# Patient Record
Sex: FEMALE | Race: BLACK OR AFRICAN AMERICAN | NOT HISPANIC OR LATINO | Employment: STUDENT | URBAN - METROPOLITAN AREA
[De-identification: names, ages, dates, MRNs, and addresses within clinical notes are randomized per-mention and may not be internally consistent; named-entity substitution may affect disease eponyms.]

---
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
## 2022-01-26 ENCOUNTER — TELEPHONE (OUTPATIENT)
Dept: FAMILY MEDICINE CLINIC | Facility: CLINIC | Age: 22
End: 2022-01-26

## 2022-01-26 NOTE — TELEPHONE ENCOUNTER
Caller: Berkley Salazar    Relationship to patient: Self    Best call back number: [phone]    Chief complaint: VAGINAL IRRITATION    Requested date: ASAP     Additional notes:THE PATIENT WOULD LIKE TO KNOW IF PCP CAN PERFORM AN STD TEST. SHE WOULD LIKE A CALL BACK TO DISCUSS

## 2022-02-03 ENCOUNTER — OFFICE VISIT (OUTPATIENT)
Dept: OBSTETRICS AND GYNECOLOGY | Facility: CLINIC | Age: 22
End: 2022-02-03

## 2022-02-03 VITALS
BODY MASS INDEX: 37.8 KG/M2 | DIASTOLIC BLOOD PRESSURE: 78 MMHG | HEART RATE: 67 BPM | WEIGHT: 264 LBS | SYSTOLIC BLOOD PRESSURE: 118 MMHG | HEIGHT: 70 IN

## 2022-02-03 DIAGNOSIS — R30.0 DYSURIA: Primary | ICD-10-CM

## 2022-02-03 DIAGNOSIS — N91.2 AMENORRHEA: ICD-10-CM

## 2022-02-03 LAB
BILIRUB BLD-MCNC: NEGATIVE MG/DL
CLARITY, POC: CLEAR
COLOR UR: YELLOW
GLUCOSE UR STRIP-MCNC: NEGATIVE MG/DL
KETONES UR QL: NEGATIVE
LEUKOCYTE EST, POC: NEGATIVE
NITRITE UR-MCNC: NEGATIVE MG/ML
PH UR: 6 [PH] (ref 5–8)
PROT UR STRIP-MCNC: NEGATIVE MG/DL
RBC # UR STRIP: NEGATIVE /UL
SP GR UR: 1.03 (ref 1–1.03)
UROBILINOGEN UR QL: NORMAL

## 2022-02-03 PROCEDURE — 81002 URINALYSIS NONAUTO W/O SCOPE: CPT | Performed by: NURSE PRACTITIONER

## 2022-02-03 PROCEDURE — 99212 OFFICE O/P EST SF 10 MIN: CPT | Performed by: NURSE PRACTITIONER

## 2022-02-03 NOTE — PROGRESS NOTES
"GYN Visit    CC:   Chief Complaint   Patient presents with   • Follow-up     3 month follow up        HPI:   21 y.o.No obstetric history on file. Contraception or HRT: None    Patient is here for follow-up on irregular cycles and secondary amenorrhea. States took Provera as prescribed and has had 2 cycles. Happy with current treatment and does not want any other birth control. Did have some urinary burning last week which is since resolved but requested urine dip which was negative.      History: PMHx, Meds, Allergies, PSHx, Social Hx, and POBHx all reviewed and updated.    Review of Systems   Constitutional: Negative.    HENT: Negative.    Eyes: Negative.    Respiratory: Negative.    Cardiovascular: Negative.    Endocrine: Negative.    Genitourinary: Positive for dysuria.   Musculoskeletal: Negative.    Skin: Negative.    Allergic/Immunologic: Negative.         No new allergies.   Neurological: Negative.    Hematological: Negative.    Psychiatric/Behavioral: Negative.        PHYSICAL EXAM:  /78 (BP Location: Right arm, Patient Position: Sitting, Cuff Size: Adult)   Pulse 67   Ht 177.8 cm (70\")   Wt 120 kg (264 lb)   Breastfeeding No   BMI 37.88 kg/m²      Physical Exam  Vitals and nursing note reviewed.   Constitutional:       Appearance: Normal appearance. She is well-developed and well-groomed.   Neurological:      Mental Status: She is alert.   Psychiatric:         Attention and Perception: Attention and perception normal.         Mood and Affect: Affect normal.         Speech: Speech normal.         Behavior: Behavior is cooperative.         Cognition and Memory: Cognition normal.         ASSESSMENT AND PLAN:  Diagnoses and all orders for this visit:    1. Dysuria (Primary)  -     POC Urinalysis Dipstick    2. Amenorrhea        Counseling:  • Patient will continue to monitor her cycle and return as needed.    Follow Up:  Return as needed.          Stefan Flores, APRN  02/03/2022    [facility] " [address]  [facility]  [address] [place] [address]  Dept: [phone]  Loc: [phone]

## 2022-02-17 ENCOUNTER — OFFICE VISIT (OUTPATIENT)
Dept: FAMILY MEDICINE CLINIC | Facility: CLINIC | Age: 22
End: 2022-02-17

## 2022-02-17 VITALS
BODY MASS INDEX: 36.43 KG/M2 | WEIGHT: 254.5 LBS | OXYGEN SATURATION: 97 % | HEART RATE: 112 BPM | DIASTOLIC BLOOD PRESSURE: 68 MMHG | HEIGHT: 70 IN | SYSTOLIC BLOOD PRESSURE: 128 MMHG | TEMPERATURE: 98.2 F

## 2022-02-17 DIAGNOSIS — J02.9 SORE THROAT: ICD-10-CM

## 2022-02-17 DIAGNOSIS — J02.9 ACUTE PHARYNGITIS, UNSPECIFIED ETIOLOGY: Primary | ICD-10-CM

## 2022-02-17 PROCEDURE — 99213 OFFICE O/P EST LOW 20 MIN: CPT | Performed by: FAMILY MEDICINE

## 2022-02-17 PROCEDURE — 96372 THER/PROPH/DIAG INJ SC/IM: CPT | Performed by: FAMILY MEDICINE

## 2022-02-17 RX ORDER — IBUPROFEN 200 MG
100 TABLET ORAL EVERY 6 HOURS PRN
COMMUNITY
End: 2022-03-17

## 2022-02-17 RX ORDER — ACETAMINOPHEN 160 MG/5ML
30 SOLUTION ORAL EVERY 4 HOURS PRN
COMMUNITY
End: 2022-03-17

## 2022-02-17 RX ORDER — TRIAMCINOLONE ACETONIDE 40 MG/ML
40 INJECTION, SUSPENSION INTRA-ARTICULAR; INTRAMUSCULAR ONCE
Status: COMPLETED | OUTPATIENT
Start: 2022-02-17 | End: 2022-02-17

## 2022-02-17 RX ORDER — AMOXICILLIN 400 MG/5ML
500 POWDER, FOR SUSPENSION ORAL 3 TIMES DAILY
COMMUNITY
Start: 2022-02-11 | End: 2022-02-17

## 2022-02-17 RX ORDER — AMOXICILLIN AND CLAVULANATE POTASSIUM 600; 42.9 MG/5ML; MG/5ML
875 POWDER, FOR SUSPENSION ORAL 2 TIMES DAILY
Qty: 144 ML | Refills: 0 | Status: SHIPPED | OUTPATIENT
Start: 2022-02-17 | End: 2022-02-26

## 2022-02-17 RX ORDER — BENZONATATE 200 MG/1
200 CAPSULE ORAL 3 TIMES DAILY PRN
Qty: 30 CAPSULE | Refills: 0 | Status: SHIPPED | OUTPATIENT
Start: 2022-02-17 | End: 2022-02-26

## 2022-02-17 RX ORDER — ACETAMINOPHEN 325 MG/1
325 TABLET ORAL EVERY 6 HOURS PRN
COMMUNITY
End: 2022-06-12

## 2022-02-17 RX ORDER — LIDOCAINE HYDROCHLORIDE 20 MG/ML
10 SOLUTION OROPHARYNGEAL 4 TIMES DAILY PRN
Qty: 280 ML | Refills: 1 | Status: SHIPPED | OUTPATIENT
Start: 2022-02-17 | End: 2022-02-24

## 2022-02-17 RX ADMIN — TRIAMCINOLONE ACETONIDE 40 MG: 40 INJECTION, SUSPENSION INTRA-ARTICULAR; INTRAMUSCULAR at 11:25

## 2022-02-17 NOTE — PROGRESS NOTES
"Chief Complaint  Sore throat    Subjective          Berkley Salazar presents to [facility]  History of Present Illness  Patient presents today for an acute visit complaining of a sore throat. She went to [facility] on 2/10/2022. At that time she was given amoxicillin. She had a hard time swallowing tablets that I sent in liquid amoxicillin for her. She tested negative for flu, strep, and Covid. Symptoms have been persisting. She is not getting any better and is still having fever cough and fatigue. She reports that it hurts to swallow. She has not been eating as much either.  Objective   Vital Signs:   /68   Pulse 112   Temp 98.2 °F (36.8 °C)   Ht 177.8 cm (70\")   Wt 115 kg (254 lb 8 oz)   SpO2 97%   BMI 36.52 kg/m²     Physical Exam   General: AAO ×3, ill-appearing  HEENT: Normocephalic, atraumatic, nasal congestion noted bilaterally with oropharyngeal erythema and tonsillar exudates, tonsils are enlarged 3+, there is no cervical tenderness or lymphadenopathy. No appreciable peritonsillar abscess. The uvula is midline.  Cardiovascular: Regular rate and rhythm without appreciable murmur  Respiratory: Clear to auscultation bilaterally no RRW  Gastrointestinal: Soft nontender nondistended with bowel sounds present  extremities: No edema  Neurologic: CN II through XII grossly intact   Psychiatric: Normal mood and affect  Result Review :                 Assessment and Plan    Diagnoses and all orders for this visit:    1. Acute pharyngitis, unspecified etiology (Primary)    2. Sore throat  -     triamcinolone acetonide (KENALOG-40) injection 40 mg    Other orders  -     Lidocaine Viscous HCl (XYLOCAINE) 2 % solution; Take 10 mL by mouth 4 (Four) Times a Day As Needed for Moderate Pain  for up to 7 days.  Dispense: 280 mL; Refill: 1  -     prednisoLONE (PRELONE) 15 MG/5ML syrup; Take 10 ml PO daily x 5 days  Dispense: 50 mL; Refill: 0  -     benzonatate (TESSALON) 200 MG capsule; " Take 1 capsule by mouth 3 (Three) Times a Day As Needed for Cough for up to 10 days.  Dispense: 30 capsule; Refill: 0  -     amoxicillin-clavulanate (Augmentin ES-600) 600-42.9 MG/5ML suspension; Take 7.2 mL by mouth 2 (Two) Times a Day for 10 days.  Dispense: 144 mL; Refill: 0    Patient has not been responding to amoxicillin. I will switch her to Augmentin give her 40 mg of Kenalog today and give her liquid prednisolone, and viscous lidocaine. Patient instructed to also use Chloraseptic spray if needed. Plan to see her back in 1 week for close follow-up. I will keep her out of school until she returns for follow-up. If she gets worse including difficulty breathing or unable to swallow at all she is instructed to go to the emergency room.    Follow Up   Return in about 1 week (around 2/24/2022) for pharyngitis.  Patient was given instructions and counseling regarding her condition or for health maintenance advice. Please see specific information pulled into the AVS if appropriate.

## 2022-02-21 ENCOUNTER — OFFICE VISIT (OUTPATIENT)
Dept: FAMILY MEDICINE CLINIC | Facility: CLINIC | Age: 22
End: 2022-02-21

## 2022-02-21 VITALS
DIASTOLIC BLOOD PRESSURE: 80 MMHG | OXYGEN SATURATION: 97 % | TEMPERATURE: 97.5 F | HEIGHT: 70 IN | SYSTOLIC BLOOD PRESSURE: 112 MMHG | WEIGHT: 251.8 LBS | HEART RATE: 82 BPM | BODY MASS INDEX: 36.05 KG/M2

## 2022-02-21 DIAGNOSIS — J02.9 ACUTE PHARYNGITIS, UNSPECIFIED ETIOLOGY: Primary | ICD-10-CM

## 2022-02-21 PROCEDURE — 99212 OFFICE O/P EST SF 10 MIN: CPT | Performed by: FAMILY MEDICINE

## 2022-02-21 NOTE — PROGRESS NOTES
"Chief Complaint  Follow up for sore throat  Improved sore throat    Subjective          Berkley Salazar presents to [facility]  History of Present Illness  Patient presents today for follow-up for sore throat/pharyngitis.  I saw her for a visit on 2/17/2022.  At that time she was following up from a Taras's visit from 2/10/2022.  She was given amoxicillin however she was not getting any better.  She is feeling better now.  I placed her on prednisolone, Tessalon Perles, and Augmentin.  I also gave her viscous lidocaine.  She has not been using the viscous lidocaine as much but reports getting better with treatment.  Her throat is still little bit sore.  She denies any fever.  Patient is able to take in more food now however her weight has still come down from the last visit.  She is down 3 pounds from last visit.  Objective   Vital Signs:   /80   Pulse 82   Temp 97.5 °F (36.4 °C)   Ht 177.8 cm (70\")   Wt 114 kg (251 lb 12.8 oz)   SpO2 97%   BMI 36.13 kg/m²     Physical Exam   General: AAO ×3, no acute distress, pleasant  HEENT: Normocephalic, atraumatic, no discharge in the eyes, no discharge from the nose, tonsils are smaller compared to previous at 2+ with no exudate, and TMs intact bilaterally with no erythema, no cervical tenderness or lymphadenopathy  Cardiovascular: Regular rate and rhythm without appreciable murmur  Respiratory: Clear to auscultation bilaterally no RRW  Gastrointestinal: Soft nontender nondistended with bowel sounds present  extremities: No edema  Neurologic: CN II through XII grossly intact   Psychiatric: Normal mood and affect  Result Review :                 Assessment and Plan    Diagnoses and all orders for this visit:    1. Acute pharyngitis, unspecified etiology (Primary)    Patient doing a lot better.  We discussed continue current management of acute pharyngitis with Augmentin.  She will continue with her prednisolone as well.  Patient to " return as needed should any further issues or concerns arise.    Follow Up   Return if symptoms worsen or fail to improve.  Patient was given instructions and counseling regarding her condition or for health maintenance advice. Please see specific information pulled into the AVS if appropriate.

## 2022-02-26 ENCOUNTER — HOSPITAL ENCOUNTER (EMERGENCY)
Facility: HOSPITAL | Age: 22
Discharge: HOME | End: 2022-02-26
Attending: EMERGENCY MEDICINE | Admitting: EMERGENCY MEDICINE

## 2022-02-26 VITALS
RESPIRATION RATE: 20 BRPM | OXYGEN SATURATION: 97 % | SYSTOLIC BLOOD PRESSURE: 106 MMHG | DIASTOLIC BLOOD PRESSURE: 69 MMHG | HEART RATE: 115 BPM | TEMPERATURE: 99.9 F | WEIGHT: 250.44 LBS | HEIGHT: 70 IN | BODY MASS INDEX: 35.85 KG/M2

## 2022-02-26 DIAGNOSIS — J03.90 TONSILLITIS WITH EXUDATE: Primary | ICD-10-CM

## 2022-02-26 DIAGNOSIS — R09.89 TONSIL PAIN: ICD-10-CM

## 2022-02-26 DIAGNOSIS — R05.9 COUGH: ICD-10-CM

## 2022-02-26 LAB
ALBUMIN SERPL-MCNC: 3.9 G/DL (ref 3.5–5.2)
ALBUMIN/GLOB SERPL: 1 G/DL
ALP SERPL-CCNC: 82 U/L (ref 39–117)
ALT SERPL W P-5'-P-CCNC: 29 U/L (ref 1–33)
ANION GAP SERPL CALCULATED.3IONS-SCNC: 13.1 MMOL/L (ref 5–15)
ANISOCYTOSIS BLD QL: NORMAL
AST SERPL-CCNC: 18 U/L (ref 1–32)
BASOPHILS # BLD AUTO: 0.03 10*3/MM3 (ref 0–0.2)
BASOPHILS NFR BLD AUTO: 0.2 % (ref 0–1.5)
BILIRUB SERPL-MCNC: 0.7 MG/DL (ref 0–1.2)
BUN SERPL-MCNC: 10 MG/DL (ref 6–20)
BUN/CREAT SERPL: 9.4 (ref 7–25)
CALCIUM SPEC-SCNC: 9.2 MG/DL (ref 8.6–10.5)
CHLORIDE SERPL-SCNC: 100 MMOL/L (ref 98–107)
CLUMPED PLATELETS: PRESENT
CO2 SERPL-SCNC: 23.9 MMOL/L (ref 22–29)
CREAT SERPL-MCNC: 1.06 MG/DL (ref 0.57–1)
DEPRECATED RDW RBC AUTO: 37.6 FL (ref 37–54)
EOSINOPHIL # BLD AUTO: 0.01 10*3/MM3 (ref 0–0.4)
EOSINOPHIL NFR BLD AUTO: 0.1 % (ref 0.3–6.2)
ERYTHROCYTE [DISTWIDTH] IN BLOOD BY AUTOMATED COUNT: 17 % (ref 12.3–15.4)
GFR SERPL CREATININE-BSD FRML MDRD: 81 ML/MIN/1.73
GLOBULIN UR ELPH-MCNC: 3.8 GM/DL
GLUCOSE SERPL-MCNC: 114 MG/DL (ref 65–99)
HCG INTACT+B SERPL-ACNC: <0.5 MIU/ML
HCT VFR BLD AUTO: 36.5 % (ref 34–46.6)
HETEROPH AB SER QL LA: NEGATIVE
HGB BLD-MCNC: 12.3 G/DL (ref 12–15.9)
HOLD SPECIMEN: NORMAL
HOLD SPECIMEN: NORMAL
IMM GRANULOCYTES # BLD AUTO: 0.09 10*3/MM3 (ref 0–0.05)
IMM GRANULOCYTES NFR BLD AUTO: 0.5 % (ref 0–0.5)
LARGE PLATELETS: NORMAL
LYMPHOCYTES # BLD AUTO: 2.32 10*3/MM3 (ref 0.7–3.1)
LYMPHOCYTES NFR BLD AUTO: 13.9 % (ref 19.6–45.3)
MACROCYTES BLD QL SMEAR: NORMAL
MCH RBC QN AUTO: 22 PG (ref 26.6–33)
MCHC RBC AUTO-ENTMCNC: 33.7 G/DL (ref 31.5–35.7)
MCV RBC AUTO: 65.2 FL (ref 79–97)
MICROCYTES BLD QL: NORMAL
MONOCYTES # BLD AUTO: 1.21 10*3/MM3 (ref 0.1–0.9)
MONOCYTES NFR BLD AUTO: 7.2 % (ref 5–12)
NEUTROPHILS NFR BLD AUTO: 13.07 10*3/MM3 (ref 1.7–7)
NEUTROPHILS NFR BLD AUTO: 78.1 % (ref 42.7–76)
NRBC BLD AUTO-RTO: 0 /100 WBC (ref 0–0.2)
PLATELET # BLD AUTO: 388 10*3/MM3 (ref 140–450)
PMV BLD AUTO: 9.7 FL (ref 6–12)
POTASSIUM SERPL-SCNC: 4 MMOL/L (ref 3.5–5.2)
PROT SERPL-MCNC: 7.7 G/DL (ref 6–8.5)
RBC # BLD AUTO: 5.6 10*6/MM3 (ref 3.77–5.28)
S PYO AG THROAT QL: NEGATIVE
SMALL PLATELETS BLD QL SMEAR: ADEQUATE
SODIUM SERPL-SCNC: 137 MMOL/L (ref 136–145)
TARGETS BLD QL SMEAR: NORMAL
WBC MORPH BLD: NORMAL
WBC NRBC COR # BLD: 16.73 10*3/MM3 (ref 3.4–10.8)
WHOLE BLOOD HOLD SPECIMEN: NORMAL
WHOLE BLOOD HOLD SPECIMEN: NORMAL

## 2022-02-26 PROCEDURE — 36415 COLL VENOUS BLD VENIPUNCTURE: CPT

## 2022-02-26 PROCEDURE — 87081 CULTURE SCREEN ONLY: CPT

## 2022-02-26 PROCEDURE — 99283 EMERGENCY DEPT VISIT LOW MDM: CPT

## 2022-02-26 PROCEDURE — 80053 COMPREHEN METABOLIC PANEL: CPT | Performed by: PHYSICIAN ASSISTANT

## 2022-02-26 PROCEDURE — 84702 CHORIONIC GONADOTROPIN TEST: CPT | Performed by: PHYSICIAN ASSISTANT

## 2022-02-26 PROCEDURE — 86308 HETEROPHILE ANTIBODY SCREEN: CPT | Performed by: PHYSICIAN ASSISTANT

## 2022-02-26 PROCEDURE — 85025 COMPLETE CBC W/AUTO DIFF WBC: CPT | Performed by: PHYSICIAN ASSISTANT

## 2022-02-26 PROCEDURE — 96372 THER/PROPH/DIAG INJ SC/IM: CPT

## 2022-02-26 PROCEDURE — 87880 STREP A ASSAY W/OPTIC: CPT

## 2022-02-26 PROCEDURE — 25010000002 DEXAMETHASONE PER 1 MG

## 2022-02-26 PROCEDURE — 85007 BL SMEAR W/DIFF WBC COUNT: CPT | Performed by: PHYSICIAN ASSISTANT

## 2022-02-26 RX ORDER — ACETAMINOPHEN 160 MG/5ML
650 SOLUTION ORAL ONCE
Status: COMPLETED | OUTPATIENT
Start: 2022-02-26 | End: 2022-02-26

## 2022-02-26 RX ORDER — SODIUM CHLORIDE 0.9 % (FLUSH) 0.9 %
10 SYRINGE (ML) INJECTION AS NEEDED
Status: DISCONTINUED | OUTPATIENT
Start: 2022-02-26 | End: 2022-02-26 | Stop reason: HOSPADM

## 2022-02-26 RX ORDER — DEXAMETHASONE SODIUM PHOSPHATE 10 MG/ML
10 INJECTION INTRAMUSCULAR; INTRAVENOUS ONCE
Status: COMPLETED | OUTPATIENT
Start: 2022-02-26 | End: 2022-02-26

## 2022-02-26 RX ORDER — CLINDAMYCIN HYDROCHLORIDE 300 MG/1
300 CAPSULE ORAL 3 TIMES DAILY
Qty: 21 CAPSULE | Refills: 0 | Status: SHIPPED | OUTPATIENT
Start: 2022-02-26 | End: 2022-03-05

## 2022-02-26 RX ORDER — BROMPHENIRAMINE MALEATE, PSEUDOEPHEDRINE HYDROCHLORIDE, AND DEXTROMETHORPHAN HYDROBROMIDE 2; 30; 10 MG/5ML; MG/5ML; MG/5ML
10 SYRUP ORAL ONCE
Status: COMPLETED | OUTPATIENT
Start: 2022-02-26 | End: 2022-02-26

## 2022-02-26 RX ORDER — BROMPHENIRAMINE MALEATE, PSEUDOEPHEDRINE HYDROCHLORIDE, AND DEXTROMETHORPHAN HYDROBROMIDE 2; 30; 10 MG/5ML; MG/5ML; MG/5ML
10 SYRUP ORAL 4 TIMES DAILY PRN
Qty: 118 ML | Refills: 0 | Status: SHIPPED | OUTPATIENT
Start: 2022-02-26 | End: 2022-03-17

## 2022-02-26 RX ADMIN — DEXAMETHASONE SODIUM PHOSPHATE 10 MG: 10 INJECTION INTRAMUSCULAR; INTRAVENOUS at 17:23

## 2022-02-26 RX ADMIN — BROMPHENIRAMINE MALEATE, PSEUDOEPHEDRINE HYDROCHLORIDE, AND DEXTROMETHORPHAN HYDROBROMIDE 10 ML: 2; 30; 10 SYRUP ORAL at 17:22

## 2022-02-26 RX ADMIN — ACETAMINOPHEN 650 MG: 160 SOLUTION ORAL at 17:22

## 2022-02-28 LAB — BACTERIA SPEC AEROBE CULT: NORMAL

## 2022-03-08 ENCOUNTER — OFFICE VISIT (OUTPATIENT)
Dept: FAMILY MEDICINE CLINIC | Facility: CLINIC | Age: 22
End: 2022-03-08

## 2022-03-08 VITALS
BODY MASS INDEX: 36.05 KG/M2 | WEIGHT: 251.8 LBS | OXYGEN SATURATION: 98 % | TEMPERATURE: 97.5 F | DIASTOLIC BLOOD PRESSURE: 78 MMHG | HEIGHT: 70 IN | HEART RATE: 66 BPM | SYSTOLIC BLOOD PRESSURE: 114 MMHG

## 2022-03-08 DIAGNOSIS — D72.829 LEUKOCYTOSIS, UNSPECIFIED TYPE: ICD-10-CM

## 2022-03-08 DIAGNOSIS — J03.90 TONSILLITIS: Primary | ICD-10-CM

## 2022-03-08 LAB
BASOPHILS # BLD AUTO: 0.02 10*3/MM3 (ref 0–0.2)
BASOPHILS NFR BLD AUTO: 0.2 % (ref 0–1.5)
DEPRECATED RDW RBC AUTO: 45.2 FL (ref 37–54)
EOSINOPHIL # BLD AUTO: 0.03 10*3/MM3 (ref 0–0.4)
EOSINOPHIL NFR BLD AUTO: 0.3 % (ref 0.3–6.2)
ERYTHROCYTE [DISTWIDTH] IN BLOOD BY AUTOMATED COUNT: 19.1 % (ref 12.3–15.4)
HCT VFR BLD AUTO: 38 % (ref 34–46.6)
HGB BLD-MCNC: 12 G/DL (ref 12–15.9)
IMM GRANULOCYTES # BLD AUTO: 0.05 10*3/MM3 (ref 0–0.05)
IMM GRANULOCYTES NFR BLD AUTO: 0.5 % (ref 0–0.5)
LYMPHOCYTES # BLD AUTO: 2.15 10*3/MM3 (ref 0.7–3.1)
LYMPHOCYTES NFR BLD AUTO: 20.9 % (ref 19.6–45.3)
MCH RBC QN AUTO: 22.3 PG (ref 26.6–33)
MCHC RBC AUTO-ENTMCNC: 31.6 G/DL (ref 31.5–35.7)
MCV RBC AUTO: 70.6 FL (ref 79–97)
MONOCYTES # BLD AUTO: 0.55 10*3/MM3 (ref 0.1–0.9)
MONOCYTES NFR BLD AUTO: 5.3 % (ref 5–12)
NEUTROPHILS NFR BLD AUTO: 7.5 10*3/MM3 (ref 1.7–7)
NEUTROPHILS NFR BLD AUTO: 72.8 % (ref 42.7–76)
NRBC BLD AUTO-RTO: 0 /100 WBC (ref 0–0.2)
PLATELET # BLD AUTO: 328 10*3/MM3 (ref 140–450)
PMV BLD AUTO: 10.4 FL (ref 6–12)
RBC # BLD AUTO: 5.38 10*6/MM3 (ref 3.77–5.28)
WBC NRBC COR # BLD: 10.3 10*3/MM3 (ref 3.4–10.8)

## 2022-03-08 PROCEDURE — 36415 COLL VENOUS BLD VENIPUNCTURE: CPT | Performed by: FAMILY MEDICINE

## 2022-03-08 PROCEDURE — 99213 OFFICE O/P EST LOW 20 MIN: CPT | Performed by: FAMILY MEDICINE

## 2022-03-08 PROCEDURE — 86665 EPSTEIN-BARR CAPSID VCA: CPT | Performed by: FAMILY MEDICINE

## 2022-03-08 PROCEDURE — 86664 EPSTEIN-BARR NUCLEAR ANTIGEN: CPT | Performed by: FAMILY MEDICINE

## 2022-03-08 PROCEDURE — 85025 COMPLETE CBC W/AUTO DIFF WBC: CPT | Performed by: FAMILY MEDICINE

## 2022-03-08 RX ORDER — CLINDAMYCIN PALMITATE HYDROCHLORIDE 75 MG/5ML
300 SOLUTION ORAL 3 TIMES DAILY
Qty: 360 ML | Refills: 0 | Status: SHIPPED | OUTPATIENT
Start: 2022-03-08 | End: 2022-03-14

## 2022-03-08 RX ORDER — CLINDAMYCIN PALMITATE HYDROCHLORIDE 75 MG/5ML
SOLUTION ORAL
COMMUNITY
Start: 2022-02-27 | End: 2022-03-08 | Stop reason: SDUPTHER

## 2022-03-08 NOTE — PROGRESS NOTES
Chief Complaint  Sore Throat    Subjective          Berkley Salazar presents to [facility]  History of Present Illness  Patient presents today to follow-up for sore throat/tonsillitis.  I saw her last on 2/21/2022.  At that time we discussed continue current management for pharyngitis with Augmentin.  She was given this along with prednisolone on 2/17/2022.  Unfortunately her symptoms continue to persist and she went to urgent care and then was referred to the emergency room due to concerns about tonsillar abscess.  In emergency room he was evaluated.  She was tested for strep a screen which was negative.  Her strep culture showed no beta-hemolytic Streptococcus isolated.  Her Monospot test was negative.  White blood cell count was elevated at 16.73.  She was empirically treated at that time for possible mono or a tonsillar abscess with antibiotics.  She was given clindamycin to start taking.  She reports that she received a total of 5 bottles of 75 mg per 5 mL clindamycin to take 20 mL 3 times a day which is 300 mg 3 times a day.  The 5 bottles equates to a total of 8 and 1/3 days of antibiotics.  She is feeling a lot better now but still having some soreness of throat.  I discussed with her completing a full 14 days I will send in more clindamycin to complete 14 days worth of antibiotics.  I discussed with her reassessment in 1 week.  I will also check an Perry-Barr virus antibody panel given concern for mono.  She overall is feeling better.  We discussed considering referral to ENT however he would like to hold off at this time and we can reassess in 1 week.  She is feeling better we discussed that she does not need to have this follow-up appointment however if she is still having issues I certainly encouraged her to keep the appointment.  She denies any more fever since she went to urgent care on 2/26/2022.  Objective   Vital Signs:   /78   Pulse 66   Temp 97.5 °F (36.4  "°C)   Ht 177.8 cm (70\")   Wt 114 kg (251 lb 12.8 oz)   SpO2 98%   BMI 36.13 kg/m²     Physical Exam   General: AAO ×3, no acute distress, pleasant  HEENT: Normocephalic, atraumatic, TM intact bilaterally with no erythema or effusion, no nasal congestion noted, there is some oropharyngeal erythema with tonsils slightly enlarged, 2+ with no exudate.  Cardiovascular: Regular rate and rhythm without appreciable murmur  Respiratory: Clear to auscultation bilaterally no RRW  Gastrointestinal: Soft nontender nondistended with bowel sounds present  extremities: No edema  Neurologic: CN II through XII grossly intact   Psychiatric: Normal mood and affect  Result Review :                 Assessment and Plan    Diagnoses and all orders for this visit:    1. Tonsillitis (Primary)  -     EBV Antibody Profile; Future  -     clindamycin (CLEOCIN) 75 MG/5ML solution; Take 20 mL by mouth 3 (Three) Times a Day for 6 days.  Dispense: 360 mL; Refill: 0  -     CBC & Differential    2. Leukocytosis, unspecified type  -     CBC & Differential    I discussed with patient checking Perry-Barr virus antibody profile today and I will give her another 6 days of clindamycin to complete 14 days of antibiotics.  I will also plan to recheck the CBC today.  Plan to see patient back in 1 week or sooner if needed.  Patient instructed to call with any questions or concerns.    Follow Up   Return in about 1 week (around 3/15/2022) for tonsillitis.  Patient was given instructions and counseling regarding her condition or for health maintenance advice. Please see specific information pulled into the AVS if appropriate.       "

## 2022-03-10 LAB
EBV NA IGG SER IA-ACNC: 66.1 U/ML (ref 0–17.9)
EBV VCA IGG SER IA-ACNC: >600 U/ML (ref 0–17.9)
EBV VCA IGM SER IA-ACNC: <36 U/ML (ref 0–35.9)
SERVICE CMNT-IMP: ABNORMAL

## 2022-03-17 ENCOUNTER — OFFICE VISIT (OUTPATIENT)
Dept: FAMILY MEDICINE CLINIC | Facility: CLINIC | Age: 22
End: 2022-03-17

## 2022-03-17 VITALS
OXYGEN SATURATION: 96 % | HEART RATE: 68 BPM | WEIGHT: 260.4 LBS | DIASTOLIC BLOOD PRESSURE: 74 MMHG | BODY MASS INDEX: 37.28 KG/M2 | TEMPERATURE: 98.4 F | SYSTOLIC BLOOD PRESSURE: 114 MMHG | HEIGHT: 70 IN

## 2022-03-17 DIAGNOSIS — B27.00 GAMMAHERPESVIRAL MONONUCLEOSIS WITHOUT COMPLICATION: Primary | ICD-10-CM

## 2022-03-17 DIAGNOSIS — J03.90 TONSILLITIS: ICD-10-CM

## 2022-03-17 DIAGNOSIS — R21 RASH: ICD-10-CM

## 2022-03-17 PROCEDURE — 99213 OFFICE O/P EST LOW 20 MIN: CPT | Performed by: FAMILY MEDICINE

## 2022-03-17 NOTE — PROGRESS NOTES
"Chief Complaint  Tonsillitis  Rash    Subjective          Berkley Salazar presents to [facility]  History of Present Illness  Patient presents today for follow-up for tonsillitis.  Last time I saw her for an appointment was on 3/8/2021.  She has been dealing with tonsillitis since 2/10/2022.  She was seen in emergency room and treated for mono with concern about tonsillar abscess and placed on clindamycin.  When I saw her for follow-up on 3/8/2022 I had her to complete a total of 14 days of clindamycin.  She was on amoxicillin before then as well as Augmentin.  She developed a rash a few days after starting amoxicillin and there was concern about allergy to amoxicillin.  She reports the rash is resolving on its own.  She saw urgent care on 3/9/2022.  I told her that they were concerned about an amoxicillin allergy and this was added to her list.  She denies any widespread body rash and reports that it was only in the bikini line area.  She took clindamycin without any issues.  I did have labs done for the patient prior to this appointment.  He tested positive for EBV VCA IgG and EBV nuclear antigen antibody but negative for VCA IgM antibodies.  This suggest past infection.  She has improved now.  She still has some discomfort with swallowing but overall feels better and denies any more fevers.  Objective   Vital Signs:   /74   Pulse 68   Temp 98.4 °F (36.9 °C)   Ht 177.8 cm (70\")   Wt 118 kg (260 lb 6.4 oz)   SpO2 96%   BMI 37.36 kg/m²     Physical Exam   General: AAO ×3, no acute distress, pleasant  HEENT: Normocephalic, atraumatic, no discharge in the eyes, no discharge from the nose, no oropharyngeal erythema or exudates, tonsils are 1+ bilaterally and TMs intact bilaterally with no erythema, no cervical tenderness or lymphadenopathy  Cardiovascular: Regular rate and rhythm without appreciable murmur  Respiratory: Clear to auscultation bilaterally no " RRW  Gastrointestinal: Soft nontender nondistended with bowel sounds present  Skin: No appreciable rash in the area of concern.  extremities: No edema  Neurologic: CN II through XII grossly intact   Psychiatric: Normal mood and affect  Result Review :                 Assessment and Plan    Diagnoses and all orders for this visit:    1. Gammaherpesviral mononucleosis without complication (Primary)    2. Rash    3. Tonsillitis    I discussed with patient the diagnosis of mononucleosis.  Her symptoms are improving.  She has had symptoms since 2/10/2022.  She does not need any more antibiotics at this time.  Her tonsillitis is resolving.  We have previously discussed ENT consultation however I will hold off at this time given that she has symptom improvement.  Her rash is resolving as well.  Patient instructed to call return if she has any worsening symptoms or further concerns.    Follow Up   Return if symptoms worsen or fail to improve.  Patient was given instructions and counseling regarding her condition or for health maintenance advice. Please see specific information pulled into the AVS if appropriate.

## 2022-05-20 ENCOUNTER — TELEPHONE (OUTPATIENT)
Dept: FAMILY MEDICINE CLINIC | Facility: CLINIC | Age: 22
End: 2022-05-20

## 2022-05-20 NOTE — TELEPHONE ENCOUNTER
Caller: YAZMIN BECKER    Relationship: Mother    Best call back number: [phone]    What is the medical concern/diagnosis: THROAT     What specialty or service is being requested: EAR NOSE AND THROAT     What is the provider, practice or medical service name: N/A    What is the office location: [place]     What is the office phone number:N/A

## 2022-05-25 DIAGNOSIS — J03.91 RECURRENT TONSILLITIS: Primary | ICD-10-CM

## 2022-06-06 ENCOUNTER — TELEPHONE (OUTPATIENT)
Dept: FAMILY MEDICINE CLINIC | Facility: CLINIC | Age: 22
End: 2022-06-06

## 2022-06-06 NOTE — TELEPHONE ENCOUNTER
Caller: Berkley Salazar    Relationship: Self    Best call back number: [phone]    What is the medical concern/diagnosis: ACNE SCARING AND SKIN TAG REMOVAL    What specialty or service is being requested: DERMATOLOGY    Any additional details: PATIENT STATED SHE IS NEEDING A REFERRAL TO DERMATOLOGY IN [place]

## 2022-06-10 DIAGNOSIS — K64.9 HEMORRHOIDS, UNSPECIFIED HEMORRHOID TYPE: Primary | ICD-10-CM

## 2022-06-16 ENCOUNTER — OFFICE VISIT (OUTPATIENT)
Dept: FAMILY MEDICINE CLINIC | Facility: CLINIC | Age: 22
End: 2022-06-16

## 2022-06-16 VITALS
DIASTOLIC BLOOD PRESSURE: 74 MMHG | HEIGHT: 70 IN | WEIGHT: 253.4 LBS | SYSTOLIC BLOOD PRESSURE: 120 MMHG | BODY MASS INDEX: 36.28 KG/M2 | HEART RATE: 88 BPM | OXYGEN SATURATION: 98 % | TEMPERATURE: 98.1 F

## 2022-06-16 DIAGNOSIS — L91.8 SKIN TAG: Primary | ICD-10-CM

## 2022-06-16 DIAGNOSIS — R39.11 URINARY HESITANCY: ICD-10-CM

## 2022-06-16 DIAGNOSIS — R39.198 DIFFICULTY URINATING: ICD-10-CM

## 2022-06-16 DIAGNOSIS — L70.0 ACNE VULGARIS: ICD-10-CM

## 2022-06-16 LAB
BILIRUB BLD-MCNC: ABNORMAL MG/DL
CLARITY, POC: CLEAR
COLOR UR: ABNORMAL
EXPIRATION DATE: ABNORMAL
GLUCOSE UR STRIP-MCNC: NEGATIVE MG/DL
KETONES UR QL: ABNORMAL
LEUKOCYTE EST, POC: NEGATIVE
Lab: ABNORMAL
NITRITE UR-MCNC: NEGATIVE MG/ML
PH UR: 5.5 [PH] (ref 5–8)
PROT UR STRIP-MCNC: NEGATIVE MG/DL
RBC # UR STRIP: NEGATIVE /UL
SP GR UR: 1.03 (ref 1–1.03)
UROBILINOGEN UR QL: NORMAL

## 2022-06-16 PROCEDURE — 87086 URINE CULTURE/COLONY COUNT: CPT | Performed by: NURSE PRACTITIONER

## 2022-06-16 PROCEDURE — 99214 OFFICE O/P EST MOD 30 MIN: CPT | Performed by: NURSE PRACTITIONER

## 2022-06-16 PROCEDURE — 81003 URINALYSIS AUTO W/O SCOPE: CPT | Performed by: NURSE PRACTITIONER

## 2022-06-16 NOTE — PROGRESS NOTES
"Chief Complaint  skin tag removal and Difficulty Urinating    Subjective        Berkley Salazar presents to [facility]  History of Present Illness  Presents today for an acute visit for difficulty urinating.  Patient reports she is having to push to urinate.  Patient has some urinary hesitancy.  Denies urinary frequency and dysuria.  Denies fever chills and back pain.  Has low blood abdominal discomfort in the suprapubic acute region.    Patient states she has skin tags on her neck.  States they are bothersome and catches on a seatbelt.  Patient inquiring about having skin tags removed.    Patient also states she has acne.  She has some acne scars.  She would like to see dermatologist for further evaluation and treatment.    Objective   Vital Signs:  /74 (BP Location: Left arm, Patient Position: Sitting)   Pulse 88   Temp 98.1 °F (36.7 °C)   Ht 177.8 cm (70\")   Wt 115 kg (253 lb 6.4 oz)   SpO2 98%   BMI 36.36 kg/m²   Estimated body mass index is 36.36 kg/m² as calculated from the following:    Height as of this encounter: 177.8 cm (70\").    Weight as of this encounter: 115 kg (253 lb 6.4 oz).          Physical Exam  Vitals reviewed.   Constitutional:       Appearance: Normal appearance. She is well-developed.   HENT:      Head: Normocephalic and atraumatic.      Right Ear: External ear normal.      Left Ear: External ear normal.      Mouth/Throat:      Pharynx: No oropharyngeal exudate.   Eyes:      Conjunctiva/sclera: Conjunctivae normal.      Pupils: Pupils are equal, round, and reactive to light.   Cardiovascular:      Rate and Rhythm: Normal rate and regular rhythm.      Heart sounds: No murmur heard.    No friction rub. No gallop.   Pulmonary:      Effort: Pulmonary effort is normal.      Breath sounds: Normal breath sounds. No wheezing or rhonchi.   Abdominal:      General: Bowel sounds are normal. There is no distension.      Palpations: Abdomen is soft.      " Tenderness: There is abdominal tenderness in the suprapubic area.   Skin:     General: Skin is warm and dry.      Comments: 2 small skin tags on the right posterior neck   Neurological:      Mental Status: She is alert and oriented to person, place, and time.        Result Review :       Brief Urine Lab Results  (Last result in the past 365 days)      Color   Clarity   Blood   Leuk Est   Nitrite   Protein   CREAT   Urine HCG        06/16/22 0753 Dark Yellow   Clear   Negative   Negative   Negative   Negative                    Skin Tag Removal    Date/Time: 6/16/2022 8:57 AM  Performed by: Ezio Rg APRN  Authorized by: Ezio Rg APRN   Preparation: Patient was prepped, alcohol cleanse the skin tags.  Local anesthesia used: yes  Anesthesia: local infiltration    Anesthesia:  Local anesthesia used: yes  Local Anesthetic: lidocaine 1% with epinephrine    Sedation:  Patient sedated: no    Patient tolerance: patient tolerated the procedure well with no immediate complications  Comments: 2 skin tags removed with derma blade on the right posterior neck            Assessment and Plan   Diagnoses and all orders for this visit:    1. Skin tag (Primary)    2. Difficulty urinating  -     POCT urinalysis dipstick, automated  -     Urine Culture - Urine, Urine, Clean Catch; Future  -     Urine Culture - Urine, Urine, Clean Catch    3. Urinary hesitancy    4. Acne vulgaris  -     Ambulatory Referral to Dermatology    Other orders  -     Skin Tag Removal    Increase fluid intake.  We will send for urine culture and sensitivity.  Will consult dermatology for her acne.         Follow Up   Return if symptoms worsen or fail to improve.  Patient was given instructions and counseling regarding her condition or for health maintenance advice. Please see specific information pulled into the AVS if appropriate.

## 2022-06-17 LAB — BACTERIA SPEC AEROBE CULT: NORMAL

## 2022-06-17 RX ORDER — NITROFURANTOIN 25; 75 MG/1; MG/1
100 CAPSULE ORAL 2 TIMES DAILY
Qty: 10 CAPSULE | Refills: 0 | Status: SHIPPED | OUTPATIENT
Start: 2022-06-17 | End: 2022-06-22

## 2022-07-15 ENCOUNTER — OFFICE VISIT (OUTPATIENT)
Dept: OTOLARYNGOLOGY | Facility: CLINIC | Age: 22
End: 2022-07-15

## 2022-07-15 VITALS — BODY MASS INDEX: 36.97 KG/M2 | WEIGHT: 258.2 LBS | TEMPERATURE: 98 F | HEIGHT: 70 IN

## 2022-07-15 DIAGNOSIS — J31.0 CHRONIC RHINITIS: Primary | ICD-10-CM

## 2022-07-15 DIAGNOSIS — K21.9 GASTROESOPHAGEAL REFLUX DISEASE, UNSPECIFIED WHETHER ESOPHAGITIS PRESENT: ICD-10-CM

## 2022-07-15 PROCEDURE — 31575 DIAGNOSTIC LARYNGOSCOPY: CPT | Performed by: OTOLARYNGOLOGY

## 2022-07-15 PROCEDURE — 99204 OFFICE O/P NEW MOD 45 MIN: CPT | Performed by: OTOLARYNGOLOGY

## 2022-07-15 RX ORDER — AZELASTINE 1 MG/ML
2 SPRAY, METERED NASAL 2 TIMES DAILY
Qty: 30 ML | Refills: 11 | Status: SHIPPED | OUTPATIENT
Start: 2022-07-15

## 2022-07-15 RX ORDER — DIBUCAINE 0.28 G/28G
OINTMENT TOPICAL
COMMUNITY
Start: 2022-06-17

## 2022-07-15 NOTE — PROGRESS NOTES
"Patient Name: Berkley Salazar   Visit Date: 07/15/2022   Patient ID: [id number]  Provider: Manuel Rider MD    Sex: female  [facility] [facility]   YOB: 2000  Location Address: [address] [place] [address], [address],?[place]?[address]    Primary Care Provider Aleks Diallo DO  Location Phone: [phone]    Referring Provider: Aleks Diallo DO        Chief Complaint  Recurrent tonsillitis/\"constant spitting\"    History of Present Illness  Berkley Salazar is a 22 y.o. female who presents to [facility] today as a consult from Aleks Diallo DO for evaluation of recurrent tonsillitis. She tells me that sometime in February she was told she had mono. She tells me since that time she has to \"spit\" frequently. It is foamy.  She feels the sensation after she swallows solids and liquids.  They are not associated with any sour taste.  She denies any watery rhinorrhea but does occasionally experience yellow thick rhinorrhea. She denies any dental problems or salivary gland swelling.  She denies any dysphagia, hoarseness, sore throat, nasal congestion, neck pain, or lymphadenopathy.     Past Medical History:   Diagnosis Date   • Mononucleosis        Past Surgical History:   Procedure Laterality Date   • WISDOM TOOTH EXTRACTION           Current Outpatient Medications:   •  dibucaine (NUPERCAINAL) 1 % ointment, Apply  topically to the appropriate area as directed., Disp: , Rfl:   •  azelastine (ASTELIN) 0.1 % nasal spray, 2 sprays into the nostril(s) as directed by provider 2 (Two) Times a Day. Use in each nostril as directed, Disp: 30 mL, Rfl: 11  •  triamcinolone (KENALOG) 0.1 % ointment, Apply  topically to the appropriate area as directed., Disp: , Rfl:      No Known Allergies    Social History     Tobacco Use   • Smoking status: Never Smoker   • Smokeless tobacco: Never Used   Vaping Use   • Vaping Use: Never used   Substance Use Topics   • Alcohol " "use: Never   • Drug use: Never        Objective     Vital Signs:   Temp 98 °F (36.7 °C) (Temporal)   Ht 177.8 cm (70\")   Wt 117 kg (258 lb 3.2 oz)   BMI 37.05 kg/m²       Physical Exam    General: Well developed, well nourished patient of stated age in no acute distress. Voice is strong and clear.   Head: Normocephalic and atraumatic.  Face: No lesions.  Bilateral parotid and submandibular glands are unremarkable.  Stensen's and Warthin's ducts are productive of clear saliva bilaterally.  House-Brackmann I/VI     bilaterally.   muscles and temporomandibular joint nontender to palpation.  No TMJ crepitus.  Eyes: PERRLA, sclerae anicteric, no conjunctival injection. Extraocular movements are intact and full. No nystagmus.   Ears: Auricles are normal in appearance. Bilateral external auditory canals are unremarkable. Bilateral tympanic membranes are clear and without effusion. Hearing normal to conversational voice.   Nose: External nose is normal in appearance. Bilateral nares are patent with normal appearing mucosa. Septum midline. Turbinates are unremarkable. No lesions.   Oral Cavity: Lips are normal in appearance. Oral mucosa is unremarkable. Gingiva is unremarkable. Normal dentition for age. Tongue is unremarkable with good movement. Hard palate is unremarkable.   Oropharynx: Soft palate is unremarkable with full movement. Uvula is unremarkable. Bilateral tonsils are unremarkable. Posterior oropharynx is unremarkable.    Larynx and hypopharynx: Deferred secondary to gag reflex.  Neck: Supple.  No mass.  Nontender to palpation.  Trachea midline. Thyroid normal size and without nodules to palpation.   Lymphatic: No lymphadenopathy upon palpation.  Respiratory: Clear to auscultation bilaterally, nonlabored respirations    Cardiovascular: RRR, no murmurs, rubs, or gallops,   Psychiatric: Appropriate affect, cooperative   Neurologic: Oriented x 3, strength symmetric in all extremities, Cranial Nerves " II-XII are grossly intact to confrontation   Skin: Warm and dry. No rashes.    Procedures     Procedure: Flexible fiberoptic nasolaryngoscopy.    Indications: Persistent foamy secretions.    Summary: The patient's bilateral nares were decongested and anesthetized with Afrin and lidocaine sprays respectively. After giving the medications ample time to take effect flexible fiberoptic scope was inserted in the patient's right naris revealing a normal-appearing nasal cavity and nasopharynx. The base of tongue, vallecula, epiglottis, aryepiglottic folds, and arytenoid cartilages are all normal-appearing aside from some edema and erythema of the arytenoids and posterior commissure. The piriform sinuses were normal in appearance. The bilateral false and true vocal folds are normal in appearance and adducted and abducted normally. The subglottis was widely patent. The patient tolerated the procedure well.      Result Review :               Assessment and Plan    Diagnoses and all orders for this visit:    1. Chronic rhinitis (Primary)  -     azelastine (ASTELIN) 0.1 % nasal spray; 2 sprays into the nostril(s) as directed by provider 2 (Two) Times a Day. Use in each nostril as directed  Dispense: 30 mL; Refill: 11    2. Gastroesophageal reflux disease, unspecified whether esophagitis present    Impressions and findings were discussed at great length.  Currently, she reports spitting up foamy secretions after she swallows.  She does this after drinking water today and produces foamy white saliva/thin mucus.  Examination today is concerning for irritation secondary to reflux versus allergies and we discussed that these may play a potential role in her secretions.  Otherwise, I am unsure of exactly why she feels like she needs to spit these foamy secretions up.  She will be tried on a course of azelastine and if this is not helpful we discussed a trial of a proton pump inhibitor.  She will call to arrange this if the nasal  spray does not help.      Follow Up   No follow-ups on file.  Patient was given instructions and counseling regarding her condition or for health maintenance advice. Please see specific information pulled into the AVS if appropriate.

## 2022-09-20 ENCOUNTER — OFFICE VISIT (OUTPATIENT)
Dept: FAMILY MEDICINE CLINIC | Facility: CLINIC | Age: 22
End: 2022-09-20

## 2022-09-20 VITALS
SYSTOLIC BLOOD PRESSURE: 108 MMHG | DIASTOLIC BLOOD PRESSURE: 82 MMHG | OXYGEN SATURATION: 100 % | WEIGHT: 258 LBS | HEART RATE: 73 BPM | BODY MASS INDEX: 36.94 KG/M2 | HEIGHT: 70 IN | TEMPERATURE: 98.1 F

## 2022-09-20 DIAGNOSIS — B36.9 FUNGAL SKIN INFECTION: ICD-10-CM

## 2022-09-20 DIAGNOSIS — N62 MACROMASTIA: Primary | ICD-10-CM

## 2022-09-20 PROCEDURE — 99213 OFFICE O/P EST LOW 20 MIN: CPT

## 2022-09-20 RX ORDER — NYSTATIN 100000 [USP'U]/G
POWDER TOPICAL 3 TIMES DAILY
Qty: 60 G | Refills: 1 | Status: SHIPPED | OUTPATIENT
Start: 2022-09-20 | End: 2022-10-19 | Stop reason: SDUPTHER

## 2022-09-20 NOTE — PROGRESS NOTES
Chief Complaint  Chief Complaint   Patient presents with   • breast resuction       Subjective          Berkley Salazar presents to [facility]  History of Present Illness  Patient presents today to discuss breast reduction.  Her bra size is 46 DDD.  She reports neck and back pain from her large breasts that prevent her from being active or being able to exercise.  She has been trying to lose weight and recently has taken phentermine and worked with a nutritionist as well as tried Nutrisystem but she is unable to lose weight, which she feels is because she is unable to be active..  She does frequently have rashes beneath her breasts and also dryness around her nipples and areolas.      Objective     Medical History:  Past Medical History:   Diagnosis Date   • Mononucleosis      Past Surgical History:   Procedure Laterality Date   • WISDOM TOOTH EXTRACTION        Social History     Tobacco Use   • Smoking status: Never Smoker   • Smokeless tobacco: Never Used   Vaping Use   • Vaping Use: Never used   Substance Use Topics   • Alcohol use: Never   • Drug use: Never     Family History   Adopted: Yes       Medications:  Prior to Admission medications    Medication Sig Start Date End Date Taking? Authorizing Provider   azelastine (ASTELIN) 0.1 % nasal spray 2 sprays into the nostril(s) as directed by provider 2 (Two) Times a Day. Use in each nostril as directed 7/15/22  Yes Manuel Rider MD   dibucaine (NUPERCAINAL) 1 % ointment Apply  topically to the appropriate area as directed. 6/17/22  Yes ProviderAllyn MD   triamcinolone (KENALOG) 0.1 % ointment Apply  topically to the appropriate area as directed. 6/7/22  Yes Emergency, Nurse Epic, RN        Allergies:   Patient has no known allergies.    Health Maintenance Due   Topic Date Due   • ANNUAL PHYSICAL  Never done   • COVID-19 Vaccine (3 - Booster for Pfizer series) 02/15/2022   • CHLAMYDIA SCREENING  09/14/2022  "          Vital Signs:     /82   Pulse 73   Temp 98.1 °F (36.7 °C)   Ht 177.8 cm (70\")   Wt 117 kg (258 lb)   SpO2 100%   BMI 37.02 kg/m²       Physical Exam  Vitals reviewed.   Constitutional:       Appearance: Normal appearance. She is well-developed. She is obese.   HENT:      Head: Normocephalic and atraumatic.      Right Ear: External ear normal.      Left Ear: External ear normal.      Mouth/Throat:      Pharynx: No oropharyngeal exudate.   Eyes:      Conjunctiva/sclera: Conjunctivae normal.      Pupils: Pupils are equal, round, and reactive to light.   Cardiovascular:      Rate and Rhythm: Normal rate and regular rhythm.      Heart sounds: No murmur heard.    No friction rub. No gallop.   Pulmonary:      Effort: Pulmonary effort is normal.      Breath sounds: Normal breath sounds. No wheezing or rhonchi.   Chest:       Abdominal:      General: Bowel sounds are normal. There is no distension.      Palpations: Abdomen is soft.      Tenderness: There is no abdominal tenderness.   Skin:     General: Skin is warm and dry.   Neurological:      Mental Status: She is alert and oriented to person, place, and time.      Cranial Nerves: No cranial nerve deficit.   Psychiatric:         Mood and Affect: Mood and affect normal.         Behavior: Behavior normal.         Thought Content: Thought content normal.         Judgment: Judgment normal.          Result Review :                   Assessment and Plan    Diagnoses and all orders for this visit:    1. Macromastia (Primary)  -     Ambulatory Referral to Plastic Surgery    2. Fungal skin infection  -     nystatin (MYCOSTATIN) 797685 UNIT/GM powder; Apply  topically to the appropriate area as directed 3 (Three) Times a Day.  Dispense: 60 g; Refill: 1    Patient will be referred to plastic surgery for consultation regarding breast reduction.  She was prescribed nystatin powder to use beneath her breast for occurrence of fungal skin infection.  For her dry skin " she was advised to apply Aquaphor or CeraVe to the area regularly.        Smoking Cessation:    Berkley Salazar  reports that she has never smoked. She has never used smokeless tobacco.          Follow Up   No follow-ups on file.  Patient was given instructions and counseling regarding her condition or for health maintenance advice. Please see specific information pulled into the AVS if appropriate.

## 2022-10-17 ENCOUNTER — TELEPHONE (OUTPATIENT)
Dept: PLASTIC SURGERY | Facility: CLINIC | Age: 22
End: 2022-10-17

## 2022-10-17 NOTE — TELEPHONE ENCOUNTER
Tried calling patient with no answer. Left a voicemail for patient to call back to go over breast reduction requirements and to have prescribed creams for 3 months before seeing us to get scheduled.

## 2022-10-19 ENCOUNTER — OFFICE VISIT (OUTPATIENT)
Dept: FAMILY MEDICINE CLINIC | Facility: CLINIC | Age: 22
End: 2022-10-19

## 2022-10-19 VITALS
WEIGHT: 263.8 LBS | DIASTOLIC BLOOD PRESSURE: 72 MMHG | SYSTOLIC BLOOD PRESSURE: 132 MMHG | HEART RATE: 64 BPM | OXYGEN SATURATION: 100 % | TEMPERATURE: 97.9 F | HEIGHT: 70 IN | BODY MASS INDEX: 37.77 KG/M2

## 2022-10-19 DIAGNOSIS — Z23 NEED FOR INFLUENZA VACCINATION: ICD-10-CM

## 2022-10-19 DIAGNOSIS — B36.9 FUNGAL SKIN INFECTION: Primary | ICD-10-CM

## 2022-10-19 PROCEDURE — 99213 OFFICE O/P EST LOW 20 MIN: CPT | Performed by: NURSE PRACTITIONER

## 2022-10-19 PROCEDURE — 90686 IIV4 VACC NO PRSV 0.5 ML IM: CPT | Performed by: NURSE PRACTITIONER

## 2022-10-19 PROCEDURE — 90471 IMMUNIZATION ADMIN: CPT | Performed by: NURSE PRACTITIONER

## 2022-10-19 RX ORDER — SPIRONOLACTONE 50 MG/1
50 TABLET, FILM COATED ORAL EVERY MORNING
COMMUNITY
Start: 2022-10-10 | End: 2023-02-10

## 2022-10-19 RX ORDER — TRETINOIN 0.5 MG/G
CREAM TOPICAL
COMMUNITY
Start: 2022-10-10

## 2022-10-19 RX ORDER — NYSTATIN 100000 [USP'U]/G
POWDER TOPICAL 3 TIMES DAILY
Qty: 60 G | Refills: 2 | Status: SHIPPED | OUTPATIENT
Start: 2022-10-19 | End: 2023-02-10

## 2022-10-19 NOTE — PROGRESS NOTES
"Chief Complaint  breast reduction    Subjective        Berkley Salazar presents to [facility]  History of Present Illness  Presents today with concerns of medication not being at the pharmacy.  She was sent to plastic surgery for breast augmentation.  At the time of last month she had a rash underneath her breast.  She was prescribed nystatin powder.  She had difficulty picking it up at the pharmacy which was unavailable.  She was able to get the rash to resolve through home remedies such as baby powder, drying under the breast, using towels.  Her appointment with the plastic surgeon was canceled yesterday and the request to use the nystatin powder for the next 3 months.      Objective   Vital Signs:  /72 (BP Location: Right arm, Patient Position: Sitting)   Pulse 64   Temp 97.9 °F (36.6 °C) (Oral)   Ht 177.8 cm (70\")   Wt 120 kg (263 lb 12.8 oz)   SpO2 100%   BMI 37.85 kg/m²   Estimated body mass index is 37.85 kg/m² as calculated from the following:    Height as of this encounter: 177.8 cm (70\").    Weight as of this encounter: 120 kg (263 lb 12.8 oz).          Physical Exam  Vitals reviewed.   Constitutional:       Appearance: Normal appearance. She is morbidly obese.   HENT:      Head: Normocephalic and atraumatic.      Right Ear: External ear normal.      Left Ear: External ear normal.      Mouth/Throat:      Pharynx: No oropharyngeal exudate.   Eyes:      Conjunctiva/sclera: Conjunctivae normal.      Pupils: Pupils are equal, round, and reactive to light.   Cardiovascular:      Rate and Rhythm: Normal rate and regular rhythm.      Heart sounds: No murmur heard.    No friction rub. No gallop.   Pulmonary:      Effort: Pulmonary effort is normal.      Breath sounds: Normal breath sounds. No wheezing or rhonchi.   Abdominal:      General: Bowel sounds are normal. There is no distension.      Palpations: Abdomen is soft.      Tenderness: There is no abdominal tenderness. "   Skin:     General: Skin is warm and dry.   Neurological:      Mental Status: She is alert and oriented to person, place, and time.   Psychiatric:         Mood and Affect: Mood and affect normal.         Behavior: Behavior normal.         Thought Content: Thought content normal.         Judgment: Judgment normal.        Result Review :                Assessment and Plan   Diagnoses and all orders for this visit:    1. Fungal skin infection (Primary)  -     nystatin (MYCOSTATIN) 527277 UNIT/GM powder; Apply  topically to the appropriate area as directed 3 (Three) Times a Day.  Dispense: 60 g; Refill: 2    2. Need for influenza vaccination  -     FluLaval/Fluzone >6 mos ([id number])    The fungal skin infection rash has improved.  We will resend in the nystatin powder to use as needed.  She will reschedule appointment with plastic surgery for breast augmentation.  Flu vaccine today.         Follow Up   No follow-ups on file.  Patient was given instructions and counseling regarding her condition or for health maintenance advice. Please see specific information pulled into the AVS if appropriate.

## 2022-11-10 ENCOUNTER — OFFICE VISIT (OUTPATIENT)
Dept: OBSTETRICS AND GYNECOLOGY | Facility: CLINIC | Age: 22
End: 2022-11-10

## 2022-11-10 VITALS
HEART RATE: 82 BPM | DIASTOLIC BLOOD PRESSURE: 79 MMHG | WEIGHT: 270 LBS | BODY MASS INDEX: 38.65 KG/M2 | HEIGHT: 70 IN | SYSTOLIC BLOOD PRESSURE: 126 MMHG

## 2022-11-10 DIAGNOSIS — Z11.3 ROUTINE SCREENING FOR STI (SEXUALLY TRANSMITTED INFECTION): Primary | ICD-10-CM

## 2022-11-10 LAB
C TRACH RRNA CVX QL NAA+PROBE: NOT DETECTED
CANDIDA SPECIES: NEGATIVE
GARDNERELLA VAGINALIS: NEGATIVE
N GONORRHOEA RRNA SPEC QL NAA+PROBE: NOT DETECTED
T VAGINALIS DNA VAG QL PROBE+SIG AMP: NEGATIVE

## 2022-11-10 PROCEDURE — 87491 CHLMYD TRACH DNA AMP PROBE: CPT | Performed by: NURSE PRACTITIONER

## 2022-11-10 PROCEDURE — 99212 OFFICE O/P EST SF 10 MIN: CPT | Performed by: NURSE PRACTITIONER

## 2022-11-10 PROCEDURE — 87480 CANDIDA DNA DIR PROBE: CPT | Performed by: NURSE PRACTITIONER

## 2022-11-10 PROCEDURE — 87510 GARDNER VAG DNA DIR PROBE: CPT | Performed by: NURSE PRACTITIONER

## 2022-11-10 PROCEDURE — 87660 TRICHOMONAS VAGIN DIR PROBE: CPT | Performed by: NURSE PRACTITIONER

## 2022-11-10 PROCEDURE — 87591 N.GONORRHOEAE DNA AMP PROB: CPT | Performed by: NURSE PRACTITIONER

## 2022-11-10 NOTE — PROGRESS NOTES
"GYN Visit    CC:   Chief Complaint   Patient presents with   • STD Screening       HPI:   22 y.o.No obstetric history on file. Contraception or HRT: Contraception:  None    Has a new partner and desires STI screening.  No symptoms.    History: PMHx, Meds, Allergies, PSHx, Social Hx, and POBHx all reviewed and updated.    Review of Systems   Constitutional: Negative.    Genitourinary: Negative.        PHYSICAL EXAM:  /79   Pulse 82   Ht 177.8 cm (70\")   Wt 122 kg (270 lb)   LMP 10/15/2022   Breastfeeding No   BMI 38.74 kg/m²      Physical Exam  Vitals and nursing note reviewed. Exam conducted with a chaperone present.   Constitutional:       Appearance: Normal appearance. She is well-developed and well-groomed.   HENT:      Head: Normocephalic.   Eyes:      Pupils: Pupils are equal, round, and reactive to light.   Genitourinary:     General: Normal vulva.      Exam position: Lithotomy position.      Pubic Area: No rash or pubic lice.       Labia:         Right: No rash, tenderness, lesion or injury.         Left: No rash, tenderness, lesion or injury.       Vagina: Normal. No foreign body. No vaginal discharge, erythema, tenderness, bleeding or lesions.      Cervix: No cervical motion tenderness or discharge.   Skin:     General: Skin is warm and dry.   Neurological:      General: No focal deficit present.      Mental Status: She is alert.         ASSESSMENT AND PLAN:  Diagnoses and all orders for this visit:    1. Routine screening for STI (sexually transmitted infection) (Primary)  -     Hepatitis B Surface Antigen  -     Hepatitis C Antibody  -     HIV-1 / O / 2 Ag / Antibody 4th Generation  -     RPR  -     Gardnerella vaginalis, Trichomonas vaginalis, Candida albicans, DNA - Swab, Cervix  -     Chlamydia trachomatis, Neisseria gonorrhoeae, PCR - Swab, Cervix        Counseling:  • Return as needed    Follow Up:  Return as needed.      Stefan Flores, APRN  11/10/2022    [facility] [place] [facility] [place] " [place]  [facility]  [address] [place] [address]  Dept: [phone]  Loc: [phone]

## 2022-12-14 ENCOUNTER — OFFICE VISIT (OUTPATIENT)
Dept: FAMILY MEDICINE CLINIC | Facility: CLINIC | Age: 22
End: 2022-12-14

## 2022-12-14 VITALS
HEIGHT: 70 IN | SYSTOLIC BLOOD PRESSURE: 128 MMHG | BODY MASS INDEX: 36.79 KG/M2 | TEMPERATURE: 100.2 F | WEIGHT: 257 LBS | DIASTOLIC BLOOD PRESSURE: 78 MMHG | OXYGEN SATURATION: 96 % | HEART RATE: 115 BPM

## 2022-12-14 DIAGNOSIS — Z86.19 HISTORY OF MONONUCLEOSIS: ICD-10-CM

## 2022-12-14 DIAGNOSIS — J02.9 SORE THROAT: Primary | ICD-10-CM

## 2022-12-14 DIAGNOSIS — J03.90 TONSILLITIS: ICD-10-CM

## 2022-12-14 DIAGNOSIS — R50.9 FEVER, UNSPECIFIED FEVER CAUSE: ICD-10-CM

## 2022-12-14 LAB
EXPIRATION DATE: NORMAL
EXPIRATION DATE: NORMAL
FLUAV AG UPPER RESP QL IA.RAPID: NOT DETECTED
FLUBV AG UPPER RESP QL IA.RAPID: NOT DETECTED
INTERNAL CONTROL: NORMAL
INTERNAL CONTROL: NORMAL
Lab: NORMAL
Lab: NORMAL
S PYO AG THROAT QL: NEGATIVE
SARS-COV-2 AG UPPER RESP QL IA.RAPID: NOT DETECTED

## 2022-12-14 PROCEDURE — 96372 THER/PROPH/DIAG INJ SC/IM: CPT | Performed by: NURSE PRACTITIONER

## 2022-12-14 PROCEDURE — 87428 SARSCOV & INF VIR A&B AG IA: CPT | Performed by: NURSE PRACTITIONER

## 2022-12-14 PROCEDURE — 99214 OFFICE O/P EST MOD 30 MIN: CPT | Performed by: NURSE PRACTITIONER

## 2022-12-14 PROCEDURE — 87880 STREP A ASSAY W/OPTIC: CPT | Performed by: NURSE PRACTITIONER

## 2022-12-14 RX ORDER — DEXAMETHASONE SODIUM PHOSPHATE 4 MG/ML
4 INJECTION, SOLUTION INTRA-ARTICULAR; INTRALESIONAL; INTRAMUSCULAR; INTRAVENOUS; SOFT TISSUE ONCE
Status: COMPLETED | OUTPATIENT
Start: 2022-12-14 | End: 2022-12-14

## 2022-12-14 RX ORDER — GUAIFENESIN 600 MG/1
1200 TABLET, EXTENDED RELEASE ORAL 2 TIMES DAILY
Qty: 20 TABLET | Refills: 1 | Status: SHIPPED | OUTPATIENT
Start: 2022-12-14 | End: 2022-12-14

## 2022-12-14 RX ORDER — LIDOCAINE HYDROCHLORIDE 20 MG/ML
5 SOLUTION OROPHARYNGEAL
Qty: 250 ML | Refills: 1 | Status: SHIPPED | OUTPATIENT
Start: 2022-12-14 | End: 2022-12-24

## 2022-12-14 RX ORDER — CLINDAMYCIN HYDROCHLORIDE 300 MG/1
300 CAPSULE ORAL 3 TIMES DAILY
Qty: 42 CAPSULE | Refills: 0 | Status: SHIPPED | OUTPATIENT
Start: 2022-12-14 | End: 2022-12-28

## 2022-12-14 RX ORDER — ACETAMINOPHEN 500 MG
1 TABLET ORAL 2 TIMES DAILY
Qty: 60 CAPSULE | Refills: 1 | Status: SHIPPED | OUTPATIENT
Start: 2022-12-14 | End: 2023-02-10

## 2022-12-14 RX ADMIN — DEXAMETHASONE SODIUM PHOSPHATE 4 MG: 4 INJECTION, SOLUTION INTRA-ARTICULAR; INTRALESIONAL; INTRAMUSCULAR; INTRAVENOUS; SOFT TISSUE at 15:09

## 2022-12-14 NOTE — PROGRESS NOTES
"Chief Complaint  Sore Throat and Med Refill    Subjective        Medical History: has a past medical history of Mononucleosis.     Surgical History: has a past surgical history that includes [place] tooth extraction.     Family History: family history is not on file. She was adopted.     Social History: reports that she has never smoked. She has never used smokeless tobacco. She reports that she does not drink alcohol and does not use drugs.    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  She states the mucus is yellow.  She does have a history of mono.  Sore Throat   This is a new problem. The current episode started in the past 7 days. The problem has been unchanged. The maximum temperature recorded prior to her arrival was 100.4 - 100.9 F. The fever has been present for 1 to 2 days. The pain is moderate. Associated symptoms include congestion, ear pain and vomiting. Pertinent negatives include no shortness of breath. Exposure to: hx of mono. She has tried nothing for the symptoms. The treatment provided no relief.       Objective   Vital Signs:   /78 (BP Location: Left arm, Patient Position: Sitting, Cuff Size: Adult)   Pulse 115   Temp 100.2 °F (37.9 °C) (Temporal)   Ht 177.8 cm (70\")   Wt 117 kg (257 lb)   SpO2 96%   BMI 36.88 kg/m²       Wt Readings from Last 3 Encounters:   12/14/22 117 kg (257 lb)   11/10/22 122 kg (270 lb)   10/19/22 120 kg (263 lb 12.8 oz)        BP Readings from Last 3 Encounters:   12/14/22 128/78   11/10/22 126/79   10/19/22 132/72               Physical Exam  Vitals reviewed.   Constitutional:       Appearance: Normal appearance. She is well-developed.   HENT:      Head: Normocephalic and atraumatic.      Mouth/Throat:      Mouth: Mucous membranes are moist.      Pharynx: " Posterior oropharyngeal erythema present.      Tonsils: Tonsillar exudate present. 4+ on the right. 4+ on the left.      Comments: Possible early tonsillar abscess right side, exudate on tonsils, tonsils +4  Eyes:      Conjunctiva/sclera: Conjunctivae normal.      Pupils: Pupils are equal, round, and reactive to light.   Cardiovascular:      Rate and Rhythm: Normal rate and regular rhythm.      Heart sounds: No murmur heard.  Pulmonary:      Effort: Pulmonary effort is normal.      Breath sounds: Normal breath sounds. No wheezing or rhonchi.   Skin:     General: Skin is warm and dry.   Neurological:      Mental Status: She is alert and oriented to person, place, and time.   Psychiatric:         Mood and Affect: Mood and affect normal.         Behavior: Behavior normal.         Thought Content: Thought content normal.         Judgment: Judgment normal.        Result Review :  {The following data was reviewed by PAULINO Harris on 12/14/2022.  Common labs    Common Labs 2/26/22 2/26/22 3/8/22    1417 1417    Glucose 114 (A)     BUN 10     Creatinine 1.06 (A)     eGFR African Am 81     Sodium 137     Potassium 4.0     Chloride 100     Calcium 9.2     Albumin 3.90     Total Bilirubin 0.7     Alkaline Phosphatase 82     AST (SGOT) 18     ALT (SGPT) 29     WBC  16.73 (A) 10.30   Hemoglobin  12.3 12.0   Hematocrit  36.5 38.0   Platelets  388 328   (A) Abnormal value                       Lab Results   Component Value Date    RAPSCRN Negative 12/14/2022       Current Outpatient Medications on File Prior to Visit   Medication Sig Dispense Refill   • dibucaine (NUPERCAINAL) 1 % ointment Apply  topically to the appropriate area as directed.     • nystatin (MYCOSTATIN) 370751 UNIT/GM powder Apply  topically to the appropriate area as directed 3 (Three) Times a Day. 60 g 2   • spironolactone (ALDACTONE) 50 MG tablet Take 1 tablet by mouth Every Morning.     • tretinoin (RETIN-A) 0.05 % cream APPLY PEA-SIZED AMOUNT TO  FACE AT BEDTIME FOR ACNE     • triamcinolone (KENALOG) 0.1 % ointment Apply  topically to the appropriate area as directed.     • azelastine (ASTELIN) 0.1 % nasal spray 2 sprays into the nostril(s) as directed by provider 2 (Two) Times a Day. Use in each nostril as directed 30 mL 11     No current facility-administered medications on file prior to visit.        Assessment and Plan  Diagnoses and all orders for this visit:    1. Sore throat (Primary)  -     POCT rapid strep A    2. Fever, unspecified fever cause  -     POCT SARS-CoV-2 Antigen JENNY    3. History of mononucleosis    4. Possible early peritonsillar abscess right side  Comments:  Start on clindamycin, patient given Decadron injection in office, will also start on Mucinex, and viscous lidocaine as needed to help with the throat pain.  Rotate Tylenol Motrin as needed for pain and fever.  Patient given strict return precautions or when to go to the ER, dad at bedside, have patient follow-up in office in 1 week to reassess throat.  Patient verbalized understanding.  Orders:  -     dexamethasone (DECADRON) injection 4 mg    Other orders  -     clindamycin (Cleocin) 300 MG capsule; Take 1 capsule by mouth 3 (Three) Times a Day for 14 days.  Dispense: 42 capsule; Refill: 0  -     Discontinue: guaiFENesin (Mucinex) 600 MG 12 hr tablet; Take 2 tablets by mouth 2 (Two) Times a Day.  Dispense: 20 tablet; Refill: 1  -     Lidocaine Viscous HCl (XYLOCAINE) 2 % solution; Take 5 mL by mouth Every 3 (Three) Hours As Needed for Mild Pain or Moderate Pain for up to 10 days.  Dispense: 250 mL; Refill: 1  -     Probiotic, Lactobacillus, capsule; Take 1 capsule by mouth 2 (Two) Times a Day.  Dispense: 60 capsule; Refill: 1  -     pseudoephedrine-guaifenesin (TUSSIN PE)  MG/5ML syrup; Take 5 mL by mouth Every 4 (Four) Hours As Needed for Congestion, Cough or Allergies.  Dispense: 280 mL; Refill: 0        Follow Up   No follow-ups on file.  Patient was given instructions  and counseling regarding her condition or for health maintenance advice. Please see specific information pulled into the AVS if appropriate.       Part of this note may be electronic transcription/translation of spoken language to printed text using the Dragon dictation system

## 2022-12-15 ENCOUNTER — TELEPHONE (OUTPATIENT)
Dept: FAMILY MEDICINE CLINIC | Facility: CLINIC | Age: 22
End: 2022-12-15

## 2022-12-15 NOTE — TELEPHONE ENCOUNTER
Caller: Berkley Salazar    Relationship: Self     Best call back number: [phone]    Requested Prescriptions:   Requested Prescriptions      No prescriptions requested or ordered in this encounter        Hydroquinone    PROBIOTIC    Pharmacy where request should be sent: [id number] - [place], [place] - [address] AT [facility] - [phone] [facility] [phone]      Additional details provided by patient:    THE PATIENT SAID SHE SAW PCP WHITE YESTERDAY AND SHE WAS SUPPOSED TO RECEIVED 4 PRESCRIPTIONS. SHE SAID SHE RECEIVED 2  OF THE 4. SHE SAID SHE IS NEEDING THE PROBIOTIC. SHE SAID SHE BOUGHT THE MUCINEX HERSELF OVER THE COUNTER. SHE IS REQUESTING PCP TO SEND THESE OVER TO THE PHARMACY       Would you like a call back once the refill request has been completed: [x] Yes [] No    If the office needs to give you a call back, can they leave a voicemail: [x] Yes [] No    Constance Nguyen Rep   12/15/22 12:09 EST

## 2022-12-15 NOTE — TELEPHONE ENCOUNTER
Called patient's pharmacy to inquire of Rx concern. Verified with pharmacist, due to patient's insurance () Rx-Probiotic and Mucinex would not be covered, patient need pay out of pocket, OTC option.    Called patient to relay update, patient voiced understanding. Request cream Rx refill. Will inform PCP of Rx request for consideration of approval.

## 2022-12-22 ENCOUNTER — OFFICE VISIT (OUTPATIENT)
Dept: FAMILY MEDICINE CLINIC | Facility: CLINIC | Age: 22
End: 2022-12-22

## 2022-12-22 VITALS
HEART RATE: 80 BPM | SYSTOLIC BLOOD PRESSURE: 130 MMHG | BODY MASS INDEX: 37.8 KG/M2 | TEMPERATURE: 98.1 F | WEIGHT: 264 LBS | HEIGHT: 70 IN | DIASTOLIC BLOOD PRESSURE: 86 MMHG | OXYGEN SATURATION: 98 %

## 2022-12-22 DIAGNOSIS — Z09 FOLLOW-UP EXAM: Primary | ICD-10-CM

## 2022-12-22 DIAGNOSIS — J36 PERITONSILLAR ABSCESS DETERMINED BY EXAMINATION: ICD-10-CM

## 2022-12-22 PROCEDURE — 99213 OFFICE O/P EST LOW 20 MIN: CPT | Performed by: NURSE PRACTITIONER

## 2022-12-22 NOTE — PROGRESS NOTES
"Chief Complaint  Sore Throat (1W follow up, symptoms have improved)    Subjective        Medical History: has a past medical history of Mononucleosis.     Surgical History: has a past surgical history that includes [place] tooth extraction.     Family History: family history is not on file. She was adopted.     Social History: reports that she has never smoked. She has never used smokeless tobacco. She reports that she does not drink alcohol and does not use drugs.    Berkley Salazar presents to [facility]  History of Present Illness  Sore Throat   This is a new problem. The current episode started in the past 7 days. The problem has been unchanged. The maximum temperature recorded prior to her arrival was 100.4 - 100.9 F. The fever has been present for 1 to 2 days. The pain is moderate. Associated symptoms include congestion, ear pain and vomiting. Pertinent negatives include no shortness of breath. Exposure to: hx of mono. She has tried nothing for the symptoms. The treatment provided no relief.   When examined patient on 12/14/2022 there did appear to be possible early right-sided peritonsillar abscess, placed patient on clindamycin 3 times daily, given Decadron injection in office due to the amount of swelling in the back of the throat.  Patient comes in today for follow-up.  Patient states she is feeling much better, she states her symptoms have completely resolved, she states she is able to speak better, and her throat is no longer bothering her.  Objective   Vital Signs:   /86 (BP Location: Left arm, Patient Position: Sitting, Cuff Size: Adult)   Pulse 80   Temp 98.1 °F (36.7 °C) (Temporal)   Ht 177.8 cm (70\")   Wt 120 kg (264 lb)   SpO2 98%   BMI 37.88 kg/m²       Wt Readings from Last 3 Encounters:   12/22/22 120 kg (264 lb)   12/14/22 117 kg (257 lb)   11/10/22 122 kg (270 lb)        BP Readings from Last 3 Encounters:   12/22/22 130/86   12/14/22 128/78   11/10/22 " 126/79               Physical Exam  Vitals reviewed.   Constitutional:       Appearance: Normal appearance. She is well-developed.   HENT:      Head: Normocephalic and atraumatic.      Mouth/Throat:      Mouth: Mucous membranes are moist.      Pharynx: No oropharyngeal exudate or posterior oropharyngeal erythema.   Eyes:      Conjunctiva/sclera: Conjunctivae normal.      Pupils: Pupils are equal, round, and reactive to light.   Cardiovascular:      Rate and Rhythm: Normal rate and regular rhythm.      Heart sounds: No murmur heard.    No friction rub.   Pulmonary:      Effort: Pulmonary effort is normal.      Breath sounds: Normal breath sounds. No wheezing or rhonchi.   Skin:     General: Skin is warm and dry.   Neurological:      Mental Status: She is alert and oriented to person, place, and time.   Psychiatric:         Mood and Affect: Mood and affect normal.         Behavior: Behavior normal.         Thought Content: Thought content normal.         Judgment: Judgment normal.        Result Review :  {The following data was reviewed by PAULINO Harris on 12/22/2022.  Common labs    Common Labs 2/26/22 2/26/22 3/8/22    1417 1417    Glucose 114 (A)     BUN 10     Creatinine 1.06 (A)     eGFR African Am 81     Sodium 137     Potassium 4.0     Chloride 100     Calcium 9.2     Albumin 3.90     Total Bilirubin 0.7     Alkaline Phosphatase 82     AST (SGOT) 18     ALT (SGPT) 29     WBC  16.73 (A) 10.30   Hemoglobin  12.3 12.0   Hematocrit  36.5 38.0   Platelets  388 328   (A) Abnormal value                        Current Outpatient Medications on File Prior to Visit   Medication Sig Dispense Refill   • azelastine (ASTELIN) 0.1 % nasal spray 2 sprays into the nostril(s) as directed by provider 2 (Two) Times a Day. Use in each nostril as directed 30 mL 11   • clindamycin (Cleocin) 300 MG capsule Take 1 capsule by mouth 3 (Three) Times a Day for 14 days. 42 capsule 0   • dibucaine (NUPERCAINAL) 1 % ointment  Apply  topically to the appropriate area as directed.     • Lidocaine Viscous HCl (XYLOCAINE) 2 % solution Take 5 mL by mouth Every 3 (Three) Hours As Needed for Mild Pain or Moderate Pain for up to 10 days. 250 mL 1   • nystatin (MYCOSTATIN) 082198 UNIT/GM powder Apply  topically to the appropriate area as directed 3 (Three) Times a Day. 60 g 2   • Probiotic, Lactobacillus, capsule Take 1 capsule by mouth 2 (Two) Times a Day. 60 capsule 1   • pseudoephedrine-guaifenesin (TUSSIN PE)  MG/5ML syrup Take 5 mL by mouth Every 4 (Four) Hours As Needed for Congestion, Cough or Allergies. 280 mL 0   • spironolactone (ALDACTONE) 50 MG tablet Take 1 tablet by mouth Every Morning.     • tretinoin (RETIN-A) 0.05 % cream APPLY PEA-SIZED AMOUNT TO FACE AT BEDTIME FOR ACNE     • triamcinolone (KENALOG) 0.1 % ointment Apply  topically to the appropriate area as directed.       No current facility-administered medications on file prior to visit.        Assessment and Plan  Diagnoses and all orders for this visit:    1. Follow-up exam (Primary)    2. Possible peritonsillar abscess determined by examination  Comments:  Symptoms have completely resolved, there is no evidence of peritonsillar abscess, throat exam was normal, patient is doing well.        Follow Up   Return for If symptoms do not improve new concerning symptoms.  Patient was given instructions and counseling regarding her condition or for health maintenance advice. Please see specific information pulled into the AVS if appropriate.       Part of this note may be electronic transcription/translation of spoken language to printed text using the Dragon dictation system

## 2023-01-25 ENCOUNTER — OFFICE VISIT (OUTPATIENT)
Dept: OTOLARYNGOLOGY | Facility: CLINIC | Age: 23
End: 2023-01-25
Payer: OTHER GOVERNMENT

## 2023-01-25 ENCOUNTER — PREP FOR SURGERY (OUTPATIENT)
Dept: OTHER | Facility: HOSPITAL | Age: 23
End: 2023-01-25
Payer: OTHER GOVERNMENT

## 2023-01-25 VITALS — WEIGHT: 258.6 LBS | TEMPERATURE: 97.7 F | HEIGHT: 70 IN | BODY MASS INDEX: 37.02 KG/M2

## 2023-01-25 DIAGNOSIS — J03.91 RECURRENT TONSILLITIS: ICD-10-CM

## 2023-01-25 DIAGNOSIS — J35.8 TONSIL STONE: ICD-10-CM

## 2023-01-25 DIAGNOSIS — J01.90 ACUTE RHINOSINUSITIS: ICD-10-CM

## 2023-01-25 DIAGNOSIS — J31.0 CHRONIC RHINITIS: Primary | ICD-10-CM

## 2023-01-25 DIAGNOSIS — J03.91 RECURRENT TONSILLITIS: Primary | ICD-10-CM

## 2023-01-25 PROCEDURE — 99214 OFFICE O/P EST MOD 30 MIN: CPT | Performed by: OTOLARYNGOLOGY

## 2023-01-25 RX ORDER — AMOXICILLIN AND CLAVULANATE POTASSIUM 875; 125 MG/1; MG/1
1 TABLET, FILM COATED ORAL EVERY 12 HOURS
Qty: 20 TABLET | Refills: 0 | Status: SHIPPED | OUTPATIENT
Start: 2023-01-25 | End: 2023-01-25

## 2023-01-25 RX ORDER — PREDNISONE 5 MG/ML
40 SOLUTION ORAL DAILY
Qty: 200 ML | Refills: 0 | Status: SHIPPED | OUTPATIENT
Start: 2023-01-25 | End: 2023-01-30

## 2023-01-25 RX ORDER — AMOXICILLIN AND CLAVULANATE POTASSIUM 250; 62.5 MG/5ML; MG/5ML
875 POWDER, FOR SUSPENSION ORAL 2 TIMES DAILY
Qty: 350 ML | Refills: 0 | Status: SHIPPED | OUTPATIENT
Start: 2023-01-25 | End: 2023-02-04

## 2023-01-25 RX ORDER — PREDNISONE 20 MG/1
40 TABLET ORAL DAILY
Qty: 10 TABLET | Refills: 0 | Status: SHIPPED | OUTPATIENT
Start: 2023-01-25 | End: 2023-01-25

## 2023-01-25 NOTE — PROGRESS NOTES
Patient Name: Berkley Salazar   Visit Date: 01/25/2023   Patient ID: [id number]  Provider: Manuel Rider MD    Sex: female  Location: [facility]   YOB: 2000  Location Address: [address] [place] [address], [address] [place] [address],?[place]?[id number]    Primary Care Provider Aleks Diallo DO  Location Phone: [phone]    Referring Provider: Aleks Diallo DO        Chief Complaint  Recurrent tonsillitis    History of Present Illness  Berkley Salazar is a 22 y.o. female who returns today for follow-up of chronic rhinitis and GERD.  She was originally seen on 7/15/2022 at which time she reported frequently spitting up foamy mucus/saliva since being diagnosed with mono in February.  She felt the sensation after she swallows solids and liquids.  Examination that day was concerning for irritation secondary to reflux versus allergies and she was placed on azelastine.  We discussed a trial of a proton pump inhibitor.    She returns today for follow-up.  She continues to produce foamy secretions even while using the azelastine.  She is seen with her mother today who provides some of the history.  They tell me that she has experienced monthly episodes of tonsillar swelling causing sore throat since February.  She also mentions occasional tonsil stones which she tends to swallow.  She has not had any issues with streptococcal tonsillitis.  She has been congested and experiencing postnasal drainage over the last few weeks.    Past Medical History:   Diagnosis Date   • Mononucleosis        Past Surgical History:   Procedure Laterality Date   • MOUTH SURGERY     • [place] TOOTH EXTRACTION           Current Outpatient Medications:   •  azelastine (ASTELIN) 0.1 % nasal spray, 2 sprays into the nostril(s) as directed by provider 2 (Two) Times a Day. Use in each nostril as directed, Disp: 30 mL, Rfl: 11  •  dibucaine (NUPERCAINAL) 1 % ointment, Apply  topically to the appropriate area as directed., Disp: , Rfl:  "  •  nystatin (MYCOSTATIN) 906450 UNIT/GM powder, Apply  topically to the appropriate area as directed 3 (Three) Times a Day., Disp: 60 g, Rfl: 2  •  Probiotic, Lactobacillus, capsule, Take 1 capsule by mouth 2 (Two) Times a Day., Disp: 60 capsule, Rfl: 1  •  pseudoephedrine-guaifenesin (TUSSIN PE)  MG/5ML syrup, Take 5 mL by mouth Every 4 (Four) Hours As Needed for Congestion, Cough or Allergies., Disp: 280 mL, Rfl: 0  •  spironolactone (ALDACTONE) 50 MG tablet, Take 1 tablet by mouth Every Morning., Disp: , Rfl:   •  tretinoin (RETIN-A) 0.05 % cream, APPLY PEA-SIZED AMOUNT TO FACE AT BEDTIME FOR ACNE, Disp: , Rfl:   •  triamcinolone (KENALOG) 0.1 % ointment, Apply  topically to the appropriate area as directed., Disp: , Rfl:   •  amoxicillin-clavulanate (AUGMENTIN) 875-125 MG per tablet, Take 1 tablet by mouth Every 12 (Twelve) Hours., Disp: 20 tablet, Rfl: 0  •  predniSONE (DELTASONE) 20 MG tablet, Take 2 tablets by mouth Daily for 5 days., Disp: 10 tablet, Rfl: 0     No Known Allergies    Social History     Tobacco Use   • Smoking status: Never   • Smokeless tobacco: Never   Vaping Use   • Vaping Use: Never used   Substance Use Topics   • Alcohol use: Never   • Drug use: Never        Objective     Vital Signs:   Temp 97.7 °F (36.5 °C) (Temporal)   Ht 177.8 cm (70\")   Wt 117 kg (258 lb 9.6 oz)   BMI 37.11 kg/m²       Physical Exam    General: Well developed, well nourished patient of stated age in no acute distress. Voice is strong and clear.   Head: Normocephalic and atraumatic.  Face: No lesions.  Bilateral parotid and submandibular glands are unremarkable.  Stensen's and Warthin's ducts are productive of clear saliva bilaterally.  House-Brackmann I/VI     bilaterally.   muscles and temporomandibular joint nontender to palpation.  No TMJ crepitus.  Eyes: PERRLA, sclerae anicteric, no conjunctival injection. Extraocular movements are intact and full. No nystagmus.   Ears: Auricles are normal " in appearance. Bilateral external auditory canals are unremarkable. Bilateral tympanic membranes are clear and without effusion. Hearing normal to conversational voice.   Nose: External nose is normal in appearance. Bilateral nares are patent with edematous and erythematous appearing mucosa and purulent appearing drainage. Septum midline. Turbinates are unremarkable. No lesions.   Oral Cavity: Lips are normal in appearance. Oral mucosa is unremarkable. Gingiva is unremarkable. Normal dentition for age. Tongue is unremarkable with good movement. Hard palate is unremarkable.   Oropharynx: Soft palate is unremarkable with full movement. Uvula is unremarkable. Bilateral tonsils are 3+. Posterior oropharynx is unremarkable.    Larynx and hypopharynx: Deferred secondary to gag reflex.  Neck: Supple.  No mass.  Nontender to palpation.  Trachea midline. Thyroid normal size and without nodules to palpation.   Lymphatic: No lymphadenopathy upon palpation.  Respiratory: Clear to auscultation bilaterally, nonlabored respirations    Cardiovascular: RRR, no murmurs, rubs, or gallops,   Psychiatric: Appropriate affect, cooperative   Neurologic: Oriented x 3, strength symmetric in all extremities, Cranial Nerves II-XII are grossly intact to confrontation   Skin: Warm and dry. No rashes.    Procedures         Result Review :               Assessment and Plan    Diagnoses and all orders for this visit:    1. Chronic rhinitis (Primary)    2. Recurrent tonsillitis    3. Tonsil stone    4. Acute rhinosinusitis  -     amoxicillin-clavulanate (AUGMENTIN) 875-125 MG per tablet; Take 1 tablet by mouth Every 12 (Twelve) Hours.  Dispense: 20 tablet; Refill: 0  -     predniSONE (DELTASONE) 20 MG tablet; Take 2 tablets by mouth Daily for 5 days.  Dispense: 10 tablet; Refill: 0    Impressions and findings were discussed at great length.  Currently, she is seen for radiation recurrent tonsillitis which has been occurring on a monthly basis since  February.  She also reports frequent tonsil stones and examination today reveals 3+ tonsils.  She also has significant nasal mucosal inflammation and purulent drainage consistent with acute rhinosinusitis.  Options for management of her conditions were discussed and she will be tried on a course of Augmentin and prednisone.  Options for management of her recurrent tonsillitis including continued observation versus bilateral tonsillectomy were discussed. The risks, benefits, and alternatives to tonsillectomy were discussed. The risks include dehydration, bleeding, pain, change in voice, continued strep throat, and nasal regurgitation.  After a thorough discussion she has elected to pursue tonsillectomy.  This will be scheduled at her earliest convenience.    Follow Up   No follow-ups on file.  Patient was given instructions and counseling regarding her condition or for health maintenance advice. Please see specific information pulled into the AVS if appropriate.

## 2023-02-10 NOTE — PRE-PROCEDURE INSTRUCTIONS
PATIENT INSTRUCTED TO BE:    - NPO AFTER MIDNIGHT EXCEPT CAN HAVE CLEAR LIQUIDS 2 HOURS PRIOR TO SURGERY ARRIVAL TIME     - TO HOLD ALL VITAMINS, SUPPLEMENTS, NSAIDS FOR ONE WEEK PRIOR TO THEIR SURGICAL PROCEDURE    -INSTRUCTED NO LOTIONS, JEWELRY, PIERCINGS, OR DEODORANT DAY OF SURGERY    - IF DIABETIC, CHECK BLOOD GLUCOSE IF LESS THAN 70 CALL PREOP AREA -AM OF SURGERY     - INSTRUCTED TO TAKE THE FOLLOWING MEDICATIONS THE DAY OF SURGERY:      ASTELIN NASAL SPRAY    PATIENT VERBALIZED UNDERSTANDING

## 2023-02-14 ENCOUNTER — ANESTHESIA (OUTPATIENT)
Dept: PERIOP | Facility: HOSPITAL | Age: 23
End: 2023-02-14
Payer: OTHER GOVERNMENT

## 2023-02-14 ENCOUNTER — HOSPITAL ENCOUNTER (OUTPATIENT)
Facility: HOSPITAL | Age: 23
Setting detail: HOSPITAL OUTPATIENT SURGERY
Discharge: HOME | End: 2023-02-14
Attending: OTOLARYNGOLOGY | Admitting: OTOLARYNGOLOGY
Payer: OTHER GOVERNMENT

## 2023-02-14 ENCOUNTER — ANESTHESIA EVENT (OUTPATIENT)
Dept: PERIOP | Facility: HOSPITAL | Age: 23
End: 2023-02-14
Payer: OTHER GOVERNMENT

## 2023-02-14 VITALS
HEIGHT: 70 IN | RESPIRATION RATE: 15 BRPM | TEMPERATURE: 97.1 F | BODY MASS INDEX: 37.75 KG/M2 | SYSTOLIC BLOOD PRESSURE: 136 MMHG | WEIGHT: 263.67 LBS | DIASTOLIC BLOOD PRESSURE: 79 MMHG | OXYGEN SATURATION: 100 % | HEART RATE: 79 BPM

## 2023-02-14 DIAGNOSIS — J03.91 RECURRENT TONSILLITIS: ICD-10-CM

## 2023-02-14 DIAGNOSIS — J35.8 TONSIL STONE: ICD-10-CM

## 2023-02-14 LAB — B-HCG UR QL: NEGATIVE

## 2023-02-14 PROCEDURE — 25010000002 ONDANSETRON PER 1 MG: Performed by: NURSE ANESTHETIST, CERTIFIED REGISTERED

## 2023-02-14 PROCEDURE — 0 HYDROMORPHONE 1 MG/ML SOLUTION: Performed by: NURSE ANESTHETIST, CERTIFIED REGISTERED

## 2023-02-14 PROCEDURE — 88304 TISSUE EXAM BY PATHOLOGIST: CPT | Performed by: OTOLARYNGOLOGY

## 2023-02-14 PROCEDURE — 25010000002 FENTANYL CITRATE (PF) 50 MCG/ML SOLUTION: Performed by: NURSE ANESTHETIST, CERTIFIED REGISTERED

## 2023-02-14 PROCEDURE — 81025 URINE PREGNANCY TEST: CPT | Performed by: ANESTHESIOLOGY

## 2023-02-14 PROCEDURE — 25010000002 DEXAMETHASONE PER 1 MG: Performed by: NURSE ANESTHETIST, CERTIFIED REGISTERED

## 2023-02-14 PROCEDURE — 25010000002 NEOSTIGMINE 10 MG/10ML SOLUTION: Performed by: NURSE ANESTHETIST, CERTIFIED REGISTERED

## 2023-02-14 PROCEDURE — 25010000002 MIDAZOLAM PER 1 MG: Performed by: ANESTHESIOLOGY

## 2023-02-14 PROCEDURE — 25010000002 PROPOFOL 10 MG/ML EMULSION: Performed by: NURSE ANESTHETIST, CERTIFIED REGISTERED

## 2023-02-14 PROCEDURE — 25010000002 HYDROMORPHONE 1 MG/ML SOLUTION: Performed by: NURSE ANESTHETIST, CERTIFIED REGISTERED

## 2023-02-14 PROCEDURE — 42826 REMOVAL OF TONSILS: CPT | Performed by: OTOLARYNGOLOGY

## 2023-02-14 RX ORDER — PROMETHAZINE HYDROCHLORIDE 25 MG/1
25 SUPPOSITORY RECTAL ONCE AS NEEDED
Status: DISCONTINUED | OUTPATIENT
Start: 2023-02-14 | End: 2023-02-14 | Stop reason: HOSPADM

## 2023-02-14 RX ORDER — ONDANSETRON 2 MG/ML
4 INJECTION INTRAMUSCULAR; INTRAVENOUS ONCE AS NEEDED
Status: DISCONTINUED | OUTPATIENT
Start: 2023-02-14 | End: 2023-02-14 | Stop reason: HOSPADM

## 2023-02-14 RX ORDER — MIDAZOLAM HYDROCHLORIDE 1 MG/ML
2 INJECTION INTRAMUSCULAR; INTRAVENOUS ONCE
Status: COMPLETED | OUTPATIENT
Start: 2023-02-14 | End: 2023-02-14

## 2023-02-14 RX ORDER — ACETAMINOPHEN 500 MG
1000 TABLET ORAL ONCE
Status: COMPLETED | OUTPATIENT
Start: 2023-02-14 | End: 2023-02-14

## 2023-02-14 RX ORDER — LIDOCAINE HYDROCHLORIDE 20 MG/ML
INJECTION, SOLUTION EPIDURAL; INFILTRATION; INTRACAUDAL; PERINEURAL AS NEEDED
Status: DISCONTINUED | OUTPATIENT
Start: 2023-02-14 | End: 2023-02-14 | Stop reason: SURG

## 2023-02-14 RX ORDER — ONDANSETRON 2 MG/ML
INJECTION INTRAMUSCULAR; INTRAVENOUS AS NEEDED
Status: DISCONTINUED | OUTPATIENT
Start: 2023-02-14 | End: 2023-02-14 | Stop reason: SURG

## 2023-02-14 RX ORDER — MEPERIDINE HYDROCHLORIDE 25 MG/ML
12.5 INJECTION INTRAMUSCULAR; INTRAVENOUS; SUBCUTANEOUS
Status: DISCONTINUED | OUTPATIENT
Start: 2023-02-14 | End: 2023-02-14 | Stop reason: HOSPADM

## 2023-02-14 RX ORDER — PROPOFOL 10 MG/ML
VIAL (ML) INTRAVENOUS AS NEEDED
Status: DISCONTINUED | OUTPATIENT
Start: 2023-02-14 | End: 2023-02-14 | Stop reason: SURG

## 2023-02-14 RX ORDER — NEOSTIGMINE METHYLSULFATE 1 MG/ML
INJECTION, SOLUTION INTRAVENOUS AS NEEDED
Status: DISCONTINUED | OUTPATIENT
Start: 2023-02-14 | End: 2023-02-14 | Stop reason: SURG

## 2023-02-14 RX ORDER — GLYCOPYRROLATE 0.2 MG/ML
INJECTION INTRAMUSCULAR; INTRAVENOUS AS NEEDED
Status: DISCONTINUED | OUTPATIENT
Start: 2023-02-14 | End: 2023-02-14 | Stop reason: SURG

## 2023-02-14 RX ORDER — SODIUM CHLORIDE, SODIUM LACTATE, POTASSIUM CHLORIDE, CALCIUM CHLORIDE 600; 310; 30; 20 MG/100ML; MG/100ML; MG/100ML; MG/100ML
9 INJECTION, SOLUTION INTRAVENOUS CONTINUOUS PRN
Status: DISCONTINUED | OUTPATIENT
Start: 2023-02-14 | End: 2023-02-14 | Stop reason: HOSPADM

## 2023-02-14 RX ORDER — OXYCODONE HYDROCHLORIDE 5 MG/1
5 TABLET ORAL
Status: DISCONTINUED | OUTPATIENT
Start: 2023-02-14 | End: 2023-02-14 | Stop reason: HOSPADM

## 2023-02-14 RX ORDER — MAGNESIUM HYDROXIDE 1200 MG/15ML
LIQUID ORAL AS NEEDED
Status: DISCONTINUED | OUTPATIENT
Start: 2023-02-14 | End: 2023-02-14 | Stop reason: HOSPADM

## 2023-02-14 RX ORDER — ROCURONIUM BROMIDE 10 MG/ML
INJECTION, SOLUTION INTRAVENOUS AS NEEDED
Status: DISCONTINUED | OUTPATIENT
Start: 2023-02-14 | End: 2023-02-14 | Stop reason: SURG

## 2023-02-14 RX ORDER — PROMETHAZINE HYDROCHLORIDE 12.5 MG/1
25 TABLET ORAL ONCE AS NEEDED
Status: DISCONTINUED | OUTPATIENT
Start: 2023-02-14 | End: 2023-02-14 | Stop reason: HOSPADM

## 2023-02-14 RX ORDER — DEXAMETHASONE SODIUM PHOSPHATE 4 MG/ML
INJECTION, SOLUTION INTRA-ARTICULAR; INTRALESIONAL; INTRAMUSCULAR; INTRAVENOUS; SOFT TISSUE AS NEEDED
Status: DISCONTINUED | OUTPATIENT
Start: 2023-02-14 | End: 2023-02-14 | Stop reason: SURG

## 2023-02-14 RX ORDER — FENTANYL CITRATE 50 UG/ML
INJECTION, SOLUTION INTRAMUSCULAR; INTRAVENOUS AS NEEDED
Status: DISCONTINUED | OUTPATIENT
Start: 2023-02-14 | End: 2023-02-14 | Stop reason: SURG

## 2023-02-14 RX ORDER — SODIUM CHLORIDE, SODIUM LACTATE, POTASSIUM CHLORIDE, CALCIUM CHLORIDE 600; 310; 30; 20 MG/100ML; MG/100ML; MG/100ML; MG/100ML
INJECTION, SOLUTION INTRAVENOUS CONTINUOUS PRN
Status: DISCONTINUED | OUTPATIENT
Start: 2023-02-14 | End: 2023-02-14 | Stop reason: SURG

## 2023-02-14 RX ORDER — DEXMEDETOMIDINE HYDROCHLORIDE 100 UG/ML
INJECTION, SOLUTION INTRAVENOUS AS NEEDED
Status: DISCONTINUED | OUTPATIENT
Start: 2023-02-14 | End: 2023-02-14 | Stop reason: SURG

## 2023-02-14 RX ADMIN — DEXAMETHASONE SODIUM PHOSPHATE 4 MG: 4 INJECTION INTRA-ARTICULAR; INTRALESIONAL; INTRAMUSCULAR; INTRAVENOUS; SOFT TISSUE at 09:50

## 2023-02-14 RX ADMIN — HYDROMORPHONE HYDROCHLORIDE 0.5 MG: 1 INJECTION, SOLUTION INTRAMUSCULAR; INTRAVENOUS; SUBCUTANEOUS at 10:13

## 2023-02-14 RX ADMIN — ONDANSETRON 4 MG: 2 INJECTION INTRAMUSCULAR; INTRAVENOUS at 09:32

## 2023-02-14 RX ADMIN — FENTANYL CITRATE 100 MCG: 50 INJECTION, SOLUTION INTRAMUSCULAR; INTRAVENOUS at 09:22

## 2023-02-14 RX ADMIN — DEXMEDETOMIDINE HYDROCHLORIDE 10 MCG: 100 INJECTION, SOLUTION, CONCENTRATE INTRAVENOUS at 09:34

## 2023-02-14 RX ADMIN — FENTANYL CITRATE 100 MCG: 50 INJECTION, SOLUTION INTRAMUSCULAR; INTRAVENOUS at 09:06

## 2023-02-14 RX ADMIN — SODIUM CHLORIDE, POTASSIUM CHLORIDE, SODIUM LACTATE AND CALCIUM CHLORIDE 9 ML/HR: 600; 310; 30; 20 INJECTION, SOLUTION INTRAVENOUS at 07:50

## 2023-02-14 RX ADMIN — DEXMEDETOMIDINE HYDROCHLORIDE 10 MCG: 100 INJECTION, SOLUTION, CONCENTRATE INTRAVENOUS at 09:23

## 2023-02-14 RX ADMIN — DEXAMETHASONE SODIUM PHOSPHATE 8 MG: 4 INJECTION INTRA-ARTICULAR; INTRALESIONAL; INTRAMUSCULAR; INTRAVENOUS; SOFT TISSUE at 09:14

## 2023-02-14 RX ADMIN — DEXMEDETOMIDINE HYDROCHLORIDE 10 MCG: 100 INJECTION, SOLUTION, CONCENTRATE INTRAVENOUS at 09:20

## 2023-02-14 RX ADMIN — PROPOFOL 200 MG: 10 INJECTION, EMULSION INTRAVENOUS at 09:06

## 2023-02-14 RX ADMIN — LIDOCAINE HYDROCHLORIDE 40 MG: 20 INJECTION, SOLUTION EPIDURAL; INFILTRATION; INTRACAUDAL; PERINEURAL at 09:06

## 2023-02-14 RX ADMIN — SODIUM CHLORIDE, POTASSIUM CHLORIDE, SODIUM LACTATE AND CALCIUM CHLORIDE: 600; 310; 30; 20 INJECTION, SOLUTION INTRAVENOUS at 09:22

## 2023-02-14 RX ADMIN — ROCURONIUM BROMIDE 40 MG: 10 INJECTION, SOLUTION INTRAVENOUS at 09:06

## 2023-02-14 RX ADMIN — HYDROMORPHONE HYDROCHLORIDE 0.5 MG: 1 INJECTION, SOLUTION INTRAMUSCULAR; INTRAVENOUS; SUBCUTANEOUS at 09:36

## 2023-02-14 RX ADMIN — ACETAMINOPHEN 1000 MG: 500 TABLET ORAL at 07:50

## 2023-02-14 RX ADMIN — LIDOCAINE HYDROCHLORIDE 60 MG: 20 INJECTION, SOLUTION EPIDURAL; INFILTRATION; INTRACAUDAL; PERINEURAL at 09:17

## 2023-02-14 RX ADMIN — MIDAZOLAM HYDROCHLORIDE 2 MG: 1 INJECTION, SOLUTION INTRAMUSCULAR; INTRAVENOUS at 08:38

## 2023-02-14 RX ADMIN — NEOSTIGMINE METHYLSULFATE 3 MG: 1 INJECTION INTRAVENOUS at 09:55

## 2023-02-14 RX ADMIN — GLYCOPYRROLATE 0.4 MG: 0.2 INJECTION INTRAMUSCULAR; INTRAVENOUS at 09:55

## 2023-02-14 RX ADMIN — HYDROMORPHONE HYDROCHLORIDE 0.5 MG: 1 INJECTION, SOLUTION INTRAMUSCULAR; INTRAVENOUS; SUBCUTANEOUS at 09:37

## 2023-02-14 NOTE — INTERVAL H&P NOTE
H&P reviewed.  The patient was examined and there are no changes to the H&P. We discussed the risks, benefits, alternatives, expected postoperative course. Allergies were viewed.

## 2023-02-14 NOTE — OP NOTE
TONSILLECTOMY AND ADENOIDECTOMY  Procedure Report    Patient Name:  Berkley Salazar  YOB: 2000    Date of Surgery:  2/14/2023     Indications:      Pre-op Diagnosis:   Recurrent tonsillitis [J03.91]  Tonsil stone [J35.8]       Post-Op Diagnosis Codes:     * Recurrent tonsillitis [J03.91]     * Tonsil stone [J35.8]    Procedure/CPT® Codes:      Procedure(s):  TONSILLECTOMY, bilateral    Staff:  Surgeon(s):  Manuel Rider MD         Anesthesia: Choice    Estimated Blood Loss: 30 mL    Implants:    Nothing was implanted during the procedure    Specimen:          Specimens     ID Source Type Tests Collected By Collected At Frozen?    A Tonsils Tissue · TISSUE PATHOLOGY EXAM   Manuel Rider MD 2/14/23 0920 No    Description: Left Tonsil    B Tonsils Tissue · TISSUE PATHOLOGY EXAM   Manuel Rider MD 2/14/23 0920 No    Description: Right Tonsil              Findings: 3+ cryptic tonsils which were inflamed.    Complications: None    Description of Procedure:   The patient was brought back to the operating room and placed supine upon the table. General endotracheal anesthesia was successfully established and the patient was prepped and draped in the usual manner for adenotonsillectomy. The McIvor mouth gag was inserted and used to expose the bilateral tonsils which were noted to be 2+. The right tonsil was grasped with the Allis forceps and retracted medially to aid in exposure. It was dissected from its fossa in the usual manner using Bovie cautery. Hemostasis was obtained using suction cautery. A similar procedure was then repeated on the patient's left palatine tonsil. The oropharynx was then irrigated profusely with sterile saline and again hemostasis was checked and confirmed. An orogastric tube placed and used to evacuate the patient's stomach contents. The McIvor mouth gag was then removed and the patient was returned to the care of anesthesia. The patient tolerated  the procedure well and was transferred to the recovery room in satisfactory condition without apparent complication.                Manuel Rider MD     Date: 2/14/2023  Time: 10:15 EST

## 2023-02-14 NOTE — ANESTHESIA PREPROCEDURE EVALUATION
Anesthesia Evaluation     Patient summary reviewed and Nursing notes reviewed   no history of anesthetic complications:  NPO Solid Status: > 8 hours  NPO Liquid Status: > 2 hours           Airway   Mallampati: I  TM distance: >3 FB  Neck ROM: full  No difficulty expected  Dental      Pulmonary - negative pulmonary ROS and normal exam    breath sounds clear to auscultation  Cardiovascular - normal exam  Exercise tolerance: good (4-7 METS)    Rhythm: regular  Rate: normal    (+) valvular problems/murmurs murmur,       Neuro/Psych- negative ROS  GI/Hepatic/Renal/Endo - negative ROS     Musculoskeletal (-) negative ROS    Abdominal    Substance History - negative use     OB/GYN negative ob/gyn ROS         Other - negative ROS       ROS/Med Hx Other: PAT Nursing Notes unavailable.                   Anesthesia Plan    ASA 3     general       Anesthetic plan, risks, benefits, and alternatives have been provided, discussed and informed consent has been obtained with: patient and mother.        CODE STATUS:

## 2023-02-14 NOTE — ANESTHESIA POSTPROCEDURE EVALUATION
Patient: Berkley Salazar    Procedure Summary     Date: 02/14/23 Room / Location: [facility] OR [facility] / [facility]    Anesthesia Start: 0901 Anesthesia Stop: 1004    Procedure: TONSILLECTOMY AND ADENOIDECTOMY (Bilateral: Throat) Diagnosis:       Recurrent tonsillitis      Tonsil stone      (Recurrent tonsillitis [J03.91])      (Tonsil stone [J35.8])    Surgeons: Manuel Rider MD Provider: Obed Farrell MD    Anesthesia Type: general ASA Status: 3          Anesthesia Type: general    Vitals  Vitals Value Taken Time   /79 02/14/23 1103   Temp 36.3 °C (97.3 °F) 02/14/23 1000   Pulse 79 02/14/23 1107   Resp 13 02/14/23 1000   SpO2 100 % 02/14/23 1107           Post Anesthesia Care and Evaluation    Patient location during evaluation: bedside  Patient participation: complete - patient participated  Level of consciousness: awake  Pain management: adequate    Airway patency: patent  Anesthetic complications: No anesthetic complications  PONV Status: none  Cardiovascular status: acceptable and stable  Respiratory status: acceptable  Hydration status: acceptable    Comments: An Anesthesiologist personally participated in the most demanding procedures (including induction and emergence if applicable) in the anesthesia plan, monitored the course of anesthesia administration at frequent intervals and remained physically present and available for immediate diagnosis and treatment of emergencies.

## 2023-02-15 LAB
CYTO UR: NORMAL
LAB AP CASE REPORT: NORMAL
LAB AP CLINICAL INFORMATION: NORMAL
PATH REPORT.FINAL DX SPEC: NORMAL
PATH REPORT.GROSS SPEC: NORMAL

## 2023-02-20 ENCOUNTER — OFFICE VISIT (OUTPATIENT)
Dept: OTOLARYNGOLOGY | Facility: CLINIC | Age: 23
End: 2023-02-20
Payer: OTHER GOVERNMENT

## 2023-02-20 VITALS — TEMPERATURE: 98.2 F | WEIGHT: 263 LBS | HEIGHT: 70 IN | BODY MASS INDEX: 37.65 KG/M2

## 2023-02-20 DIAGNOSIS — J03.91 RECURRENT TONSILLITIS: Primary | ICD-10-CM

## 2023-02-20 DIAGNOSIS — J35.8 TONSIL STONE: ICD-10-CM

## 2023-02-20 PROCEDURE — 99024 POSTOP FOLLOW-UP VISIT: CPT | Performed by: OTOLARYNGOLOGY

## 2023-02-20 NOTE — PROGRESS NOTES
Patient Name: Berkley Salazar   Visit Date: 02/20/2023   Patient ID: [id number]  Provider: Manuel Rider MD    Sex: female  Location: [facility]   YOB: 2000  Location Address: [address] [place] [address], [address] [place] [address],?[place]?[id number]    Primary Care Provider Aleks Diallo DO  Location Phone: [phone]    Referring Provider: No ref. provider found        Chief Complaint  Bleeding post tonsillectomy    History of Present Illness  Berkley Salazar is a 22 y.o. female who returns today for follow-up status post tonsillectomy secondary to recurrent tonsillitis and tonsil stones on 2/14/2023.  She is seen with her mother and they tell me that she experienced a small amount of bleeding yesterday which was quite brief.  Earlier this morning she spit up a scab which they have brought with them to clinic.  She has not had any bleeding since that time.  She is staying well-hydrated and taking both the ibuprofen and Hycet.    Past Medical History:   Diagnosis Date   • Enlarged tonsils    • Mononucleosis        Past Surgical History:   Procedure Laterality Date   • MOUTH SURGERY     • TONSILLECTOMY AND ADENOIDECTOMY Bilateral 2/14/2023    Procedure: TONSILLECTOMY AND ADENOIDECTOMY;  Surgeon: Manuel Rider MD;  [facility]: [facility];  Service: ENT;  Laterality: Bilateral;   • WISDOM TOOTH EXTRACTION           Current Outpatient Medications:   •  azelastine (ASTELIN) 0.1 % nasal spray, 2 sprays into the nostril(s) as directed by provider 2 (Two) Times a Day. Use in each nostril as directed, Disp: 30 mL, Rfl: 11  •  dibucaine (NUPERCAINAL) 1 % ointment, Apply  topically to the appropriate area as directed., Disp: , Rfl:   •  HYDROcodone-acetaminophen (HYCET) 7.5-325 MG/15ML solution, Take 15 mL by mouth Every 6 (Six) Hours As Needed for Moderate Pain or Severe Pain., Disp: 420 mL, Rfl: 0  •  ibuprofen (ADVIL,MOTRIN) 100 MG/5ML suspension, Take 40 mL by mouth Every 6 (Six) Hours As  "Needed (pain)., Disp: 473 mL, Rfl: 1  •  tretinoin (RETIN-A) 0.05 % cream, APPLY PEA-SIZED AMOUNT TO FACE AT BEDTIME FOR ACNE, Disp: , Rfl:   •  triamcinolone (KENALOG) 0.1 % ointment, Apply  topically to the appropriate area as directed., Disp: , Rfl:      No Known Allergies    Social History     Tobacco Use   • Smoking status: Never   • Smokeless tobacco: Never   Vaping Use   • Vaping Use: Never used   Substance Use Topics   • Alcohol use: Not Currently     Comment: OCC   • Drug use: Never        Objective     Vital Signs:   Temp 98.2 °F (36.8 °C) (Temporal)   Ht 177.8 cm (70\")   Wt 119 kg (263 lb)   BMI 37.74 kg/m²       Physical Exam    General: Well developed, well nourished patient of stated age in no acute distress. Voice is strong and clear.   Head: Normocephalic and atraumatic.  Face: No lesions.  Bilateral parotid and submandibular glands are unremarkable.  Stensen's and Warthin's ducts are productive of clear saliva bilaterally.  House-Brackmann I/VI     bilaterally.   muscles and temporomandibular joint nontender to palpation.  No TMJ crepitus.  Eyes: PERRLA, sclerae anicteric, no conjunctival injection. Extraocular movements are intact and full. No nystagmus.   Ears: Auricles are normal in appearance. Bilateral external auditory canals are unremarkable. Bilateral tympanic membranes are clear and without effusion. Hearing normal to conversational voice.   Nose: External nose is normal in appearance. Bilateral nares are patent with edematous and erythematous appearing mucosa and purulent appearing drainage. Septum midline. Turbinates are unremarkable. No lesions.   Oral Cavity: Lips are normal in appearance. Oral mucosa is unremarkable. Gingiva is unremarkable. Normal dentition for age. Tongue is unremarkable with good movement. Hard palate is unremarkable.   Oropharynx: Soft palate is unremarkable with full movement. Uvula is unremarkable. Bilateral tonsillar fossa with white eschar more on " the left than the right. No clots or active bleeding. Posterior oropharynx is unremarkable.    Larynx and hypopharynx: Deferred secondary to gag reflex.  Neck: Supple.  No mass.  Nontender to palpation.  Trachea midline. Thyroid normal size and without nodules to palpation.   Lymphatic: No lymphadenopathy upon palpation.  Respiratory: Clear to auscultation bilaterally, nonlabored respirations    Cardiovascular: RRR, no murmurs, rubs, or gallops,   Psychiatric: Appropriate affect, cooperative   Neurologic: Oriented x 3, strength symmetric in all extremities, Cranial Nerves II-XII are grossly intact to confrontation   Skin: Warm and dry. No rashes.    Procedures         Result Review :               Assessment and Plan    Diagnoses and all orders for this visit:    1. Recurrent tonsillitis (Primary)  -     HYDROcodone-acetaminophen (HYCET) 7.5-325 MG/15ML solution; Take 15 mL by mouth Every 6 (Six) Hours As Needed for Moderate Pain or Severe Pain.  Dispense: 420 mL; Refill: 0    2. Tonsil stone  -     HYDROcodone-acetaminophen (HYCET) 7.5-325 MG/15ML solution; Take 15 mL by mouth Every 6 (Six) Hours As Needed for Moderate Pain or Severe Pain.  Dispense: 420 mL; Refill: 0    Impressions and findings were discussed at great length.  Currently, she is seen after experiencing a small amount of bleeding yesterday and what appears to be detachment of her right tonsillar fossa eschar which she brought with her to clinic today.  On examination today there remains some eschar more on the left than the right but no active bleeding or adherent blood clots.  We discussed the pathophysiology and natural history of this condition.  Options for further evaluation and management were discussed and we will continue with routine postoperative care.  They have my cell phone so they may call with any problems or questions.    Follow Up   No follow-ups on file.  Patient was given instructions and counseling regarding her condition or for  health maintenance advice. Please see specific information pulled into the AVS if appropriate.

## 2023-02-28 ENCOUNTER — OFFICE VISIT (OUTPATIENT)
Dept: OTOLARYNGOLOGY | Facility: CLINIC | Age: 23
End: 2023-02-28
Payer: OTHER GOVERNMENT

## 2023-02-28 VITALS — TEMPERATURE: 97.1 F | BODY MASS INDEX: 35.1 KG/M2 | HEIGHT: 70 IN | WEIGHT: 245.2 LBS

## 2023-02-28 DIAGNOSIS — J03.91 RECURRENT TONSILLITIS: Primary | ICD-10-CM

## 2023-02-28 DIAGNOSIS — R11.0 NAUSEA: ICD-10-CM

## 2023-02-28 DIAGNOSIS — K21.9 GASTROESOPHAGEAL REFLUX DISEASE, UNSPECIFIED WHETHER ESOPHAGITIS PRESENT: ICD-10-CM

## 2023-02-28 PROCEDURE — 99024 POSTOP FOLLOW-UP VISIT: CPT | Performed by: NURSE PRACTITIONER

## 2023-02-28 RX ORDER — ONDANSETRON HYDROCHLORIDE 8 MG/1
8 TABLET, FILM COATED ORAL EVERY 8 HOURS PRN
Qty: 21 TABLET | Refills: 0 | Status: SHIPPED | OUTPATIENT
Start: 2023-02-28

## 2023-02-28 NOTE — PROGRESS NOTES
Patient Name: Berkley Salazar   Visit Date: 02/28/2023   Patient ID: [id number]  Provider: PAULINO Valentin    Sex: female  Location: [facility]   YOB: 2000  Location Address: [address] [place] [address], [address],?[place]?[address]    Primary Care Provider Aleks Diallo DO  Location Phone: [phone]    Referring Provider: No ref. provider found        Chief Complaint  Post-op Issues    Subjective          Berkley Salazar is a 22 y.o. female who presents to [facility] for a follow-up visit of postoperative issue.  Patient had tonsillectomy performed on 2/14/2023 by Dr. Rider for recurrent tonsillitis and tonsil stones.  She was seen by Dr. Rider on 2/20/2023 after experiencing a small amount of bleeding.  She reports that she is no longer experiencing any pain or bleeding issues but is experiencing a lot of what she describes as mucus in her mouth whenever she talks or tries to swallow.  She states that she feels somewhat nauseated by the mucus and is unable to swallow much food or drink for the past 4 days because of this.      Current Outpatient Medications on File Prior to Visit   Medication Sig   • azelastine (ASTELIN) 0.1 % nasal spray 2 sprays into the nostril(s) as directed by provider 2 (Two) Times a Day. Use in each nostril as directed   • dibucaine (NUPERCAINAL) 1 % ointment Apply  topically to the appropriate area as directed.   • tretinoin (RETIN-A) 0.05 % cream APPLY PEA-SIZED AMOUNT TO FACE AT BEDTIME FOR ACNE   • triamcinolone (KENALOG) 0.1 % ointment Apply  topically to the appropriate area as directed.   • [DISCONTINUED] HYDROcodone-acetaminophen (HYCET) 7.5-325 MG/15ML solution Take 15 mL by mouth Every 6 (Six) Hours As Needed for Moderate Pain or Severe Pain.   • [DISCONTINUED] ibuprofen (ADVIL,MOTRIN) 100 MG/5ML suspension Take 40 mL by mouth Every 6 (Six) Hours As Needed (pain).     No current facility-administered medications  "on file prior to visit.        Social History     Tobacco Use   • Smoking status: Never   • Smokeless tobacco: Never   Vaping Use   • Vaping Use: Never used   Substance Use Topics   • Alcohol use: Not Currently     Comment: OCC   • Drug use: Never        Objective     Vital Signs:   Temp 97.1 °F (36.2 °C) (Temporal)   Ht 177.8 cm (70\")   Wt 111 kg (245 lb 3.2 oz)   BMI 35.18 kg/m²       Physical Exam  Constitutional:       General: She is not in acute distress.     Appearance: Normal appearance. She is not ill-appearing.   HENT:      Head: Normocephalic and atraumatic.      Jaw: There is normal jaw occlusion. No tenderness or pain on movement.      Salivary Glands: Right salivary gland is not diffusely enlarged or tender. Left salivary gland is not diffusely enlarged or tender.      Right Ear: Tympanic membrane, ear canal and external ear normal.      Left Ear: Tympanic membrane, ear canal and external ear normal.      Nose: Nose normal. No septal deviation.      Right Sinus: No maxillary sinus tenderness or frontal sinus tenderness.      Left Sinus: No maxillary sinus tenderness or frontal sinus tenderness.      Mouth/Throat:      Lips: Pink. No lesions.      Mouth: Mucous membranes are moist. No oral lesions.      Dentition: Normal dentition.      Tongue: No lesions.      Palate: No mass and lesions.      Pharynx: Uvula midline. Oropharyngeal exudate and posterior oropharyngeal erythema present.      Tonsils: No tonsillar exudate. 0 on the right. 0 on the left.      Comments: Tonsils surgically absent, posterior oropharynx with some white exudate and erythema, healing from tonsillectomy  Eyes:      Extraocular Movements: Extraocular movements intact.      Conjunctiva/sclera: Conjunctivae normal.      Pupils: Pupils are equal, round, and reactive to light.   Neck:      Thyroid: No thyroid mass, thyromegaly or thyroid tenderness.      Trachea: Trachea normal.   Pulmonary:      Effort: Pulmonary effort is normal. " No respiratory distress.   Musculoskeletal:         General: Normal range of motion.      Cervical back: Normal range of motion and neck supple. No tenderness.   Lymphadenopathy:      Cervical: No cervical adenopathy.   Skin:     General: Skin is warm and dry.   Neurological:      General: No focal deficit present.      Mental Status: She is alert and oriented to person, place, and time.      Cranial Nerves: No cranial nerve deficit.      Motor: No weakness.      Gait: Gait normal.   Psychiatric:         Mood and Affect: Mood normal.         Behavior: Behavior normal.         Thought Content: Thought content normal.         Judgment: Judgment normal.                Result Review :               Assessment and Plan    Diagnoses and all orders for this visit:    1. Recurrent tonsillitis (Primary)    2. Nausea  -     ondansetron (Zofran) 8 MG tablet; Take 1 tablet by mouth Every 8 (Eight) Hours As Needed for Nausea or Vomiting.  Dispense: 21 tablet; Refill: 0    3. Gastroesophageal reflux disease, unspecified whether esophagitis present  -     FL Esophagram Complete; Future    I did consult with Dr. Rider who feels like she is having possible reflux and would like to order an esophagram for further evaluation.  We will get this scheduled for her in a follow-up appointment with Dr. Rider.  Also can prescribe her Zofran for nausea and have encouraged her to try to drink liquids as she is likely dehydrated.       Follow Up   No follow-ups on file.  Patient was given instructions and counseling regarding her condition or for health maintenance advice. Please see specific information pulled into the AVS if appropriate.     PAULINO Valentin

## 2023-04-12 ENCOUNTER — HOSPITAL ENCOUNTER (OUTPATIENT)
Dept: GENERAL RADIOLOGY | Facility: HOSPITAL | Age: 23
Discharge: HOME | End: 2023-04-12
Admitting: NURSE PRACTITIONER
Payer: OTHER GOVERNMENT

## 2023-04-12 DIAGNOSIS — K21.9 GASTROESOPHAGEAL REFLUX DISEASE, UNSPECIFIED WHETHER ESOPHAGITIS PRESENT: ICD-10-CM

## 2023-04-12 PROCEDURE — 74220 X-RAY XM ESOPHAGUS 1CNTRST: CPT

## 2023-04-12 PROCEDURE — 0 DIATRIZOATE MEGLUMINE & SODIUM PER 1 ML: Performed by: NURSE PRACTITIONER

## 2023-04-12 RX ADMIN — DIATRIZOATE MEGLUMINE AND DIATRIZOATE SODIUM 120 ML: 660; 100 LIQUID ORAL; RECTAL at 11:35

## 2023-04-26 ENCOUNTER — OFFICE VISIT (OUTPATIENT)
Dept: OTOLARYNGOLOGY | Facility: CLINIC | Age: 23
End: 2023-04-26
Payer: OTHER GOVERNMENT

## 2023-04-26 VITALS — HEART RATE: 92 BPM | DIASTOLIC BLOOD PRESSURE: 76 MMHG | TEMPERATURE: 97.4 F | SYSTOLIC BLOOD PRESSURE: 119 MMHG

## 2023-04-26 DIAGNOSIS — K21.9 GASTROESOPHAGEAL REFLUX DISEASE, UNSPECIFIED WHETHER ESOPHAGITIS PRESENT: ICD-10-CM

## 2023-04-26 DIAGNOSIS — J03.91 RECURRENT TONSILLITIS: ICD-10-CM

## 2023-04-26 DIAGNOSIS — J01.90 ACUTE RHINOSINUSITIS: Primary | ICD-10-CM

## 2023-04-26 RX ORDER — OMEPRAZOLE 40 MG/1
40 CAPSULE, DELAYED RELEASE ORAL DAILY
Qty: 30 CAPSULE | Refills: 3 | Status: SHIPPED | OUTPATIENT
Start: 2023-04-26 | End: 2023-05-26

## 2023-04-26 RX ORDER — PREDNISONE 20 MG/1
40 TABLET ORAL DAILY
Qty: 10 TABLET | Refills: 0 | Status: SHIPPED | OUTPATIENT
Start: 2023-04-26 | End: 2023-05-01

## 2023-04-26 RX ORDER — AMOXICILLIN AND CLAVULANATE POTASSIUM 875; 125 MG/1; MG/1
1 TABLET, FILM COATED ORAL EVERY 12 HOURS
Qty: 20 TABLET | Refills: 0 | Status: SHIPPED | OUTPATIENT
Start: 2023-04-26

## 2023-04-26 NOTE — PROGRESS NOTES
Patient Name: Berkley Salazar   Visit Date: 04/26/2023   Patient ID: [id number]  Provider: Manuel Rider MD    Sex: female  Location: [facility]   YOB: 2000  Location Address: [address] [place] [address], [address] [place] [address],?[place]?[address]    Primary Care Provider Aleks Diallo DO  Location Phone: [phone]    Referring Provider: Aleks Diallo DO        Chief Complaint  Swallow study results/nasal congestion x 5 days    History of Present Illness  Berkley Salazar is a 22 y.o. female who returns today for follow-up status post tonsillectomy secondary to recurrent tonsillitis and tonsil stones on 2/14/2023.  She did experience some bleeding after surgery but has done well.  She was last seen by PAULINO Valentin on 2/28/2023 please see that note for further details.  Esophagram on 4/12/2023 which revealed delayed clearance of contrast from the distal esophagus concerning for dysmotility.  She tells me that she continues to spit up foamy mucus.  She has also noticed more significant gag reflex since surgery which is often triggered by brushing her teeth.  Over the last 6 days she has been experiencing significant bilateral nasal congestion and green rhinorrhea.  She has tried Mucinex, saline irrigations, and DayQuil without improvement.      Past Medical History:   Diagnosis Date   • Enlarged tonsils    • Mononucleosis        Past Surgical History:   Procedure Laterality Date   • MOUTH SURGERY     • TONSILLECTOMY AND ADENOIDECTOMY Bilateral 2/14/2023    Procedure: TONSILLECTOMY AND ADENOIDECTOMY;  Surgeon: Manuel Rider MD;  [facility];  Service: ENT;  Laterality: Bilateral;   • WISDOM TOOTH EXTRACTION           Current Outpatient Medications:   •  azelastine (ASTELIN) 0.1 % nasal spray, 2 sprays into the nostril(s) as directed by provider 2 (Two) Times a Day. Use in each nostril as directed, Disp: 30 mL, Rfl: 11  •  dibucaine (NUPERCAINAL) 1 % ointment,  Apply  topically to the appropriate area as directed., Disp: , Rfl:   •  ondansetron (Zofran) 8 MG tablet, Take 1 tablet by mouth Every 8 (Eight) Hours As Needed for Nausea or Vomiting., Disp: 21 tablet, Rfl: 0  •  tretinoin (RETIN-A) 0.05 % cream, APPLY PEA-SIZED AMOUNT TO FACE AT BEDTIME FOR ACNE, Disp: , Rfl:   •  triamcinolone (KENALOG) 0.1 % ointment, Apply  topically to the appropriate area as directed., Disp: , Rfl:   •  amoxicillin-clavulanate (AUGMENTIN) 875-125 MG per tablet, Take 1 tablet by mouth Every 12 (Twelve) Hours., Disp: 20 tablet, Rfl: 0  •  omeprazole (priLOSEC) 40 MG capsule, Take 1 capsule by mouth Daily for 30 days. Take 40 mg by mouth 30 minute-1 hour before the last large meal you would eat before going to bed, Disp: 30 capsule, Rfl: 3  •  predniSONE (DELTASONE) 20 MG tablet, Take 2 tablets by mouth Daily for 5 days., Disp: 10 tablet, Rfl: 0     No Known Allergies    Social History     Tobacco Use   • Smoking status: Never   • Smokeless tobacco: Never   Vaping Use   • Vaping Use: Never used   Substance Use Topics   • Alcohol use: Not Currently     Comment: OCC   • Drug use: Never        Objective     Vital Signs:   /76   Pulse 92   Temp 97.4 °F (36.3 °C)       Physical Exam    General: Well developed, well nourished patient of stated age in no acute distress. Voice is strong and clear.   Head: Normocephalic and atraumatic.  Face: No lesions.  Bilateral parotid and submandibular glands are unremarkable.  Stensen's and Warthin's ducts are productive of clear saliva bilaterally.  House-Brackmann I/VI     bilaterally.   muscles and temporomandibular joint nontender to palpation.  No TMJ crepitus.  Eyes: PERRLA, sclerae anicteric, no conjunctival injection. Extraocular movements are intact and full. No nystagmus.   Ears: Auricles are normal in appearance. Bilateral external auditory canals are unremarkable. Bilateral tympanic membranes are clear and without effusion. Hearing  normal to conversational voice.   Nose: External nose is normal in appearance. Bilateral nares are patent with edematous and erythematous appearing mucosa and purulent appearing drainage. Septum midline. Turbinates are mildly hypertrophied. No lesions.   Oral Cavity: Lips are normal in appearance. Oral mucosa is unremarkable. Gingiva is unremarkable. Normal dentition for age. Tongue is unremarkable with good movement. Hard palate is unremarkable.   Oropharynx: Soft palate is unremarkable with full movement. Uvula is unremarkable. Bilateral tonsillar fossa with white eschar more on the left than the right. No clots or active bleeding. Posterior oropharynx is unremarkable.    Larynx and hypopharynx: Deferred secondary to gag reflex.  Neck: Supple.  No mass.  Nontender to palpation.  Trachea midline. Thyroid normal size and without nodules to palpation.   Lymphatic: No lymphadenopathy upon palpation.   Psychiatric: Appropriate affect, cooperative   Neurologic: Oriented x 3, strength symmetric in all extremities, Cranial Nerves II-XII are grossly intact to confrontation   Skin: Warm and dry. No rashes.    Procedures         Result Review :               Assessment and Plan    Diagnoses and all orders for this visit:    1. Acute rhinosinusitis (Primary)  -     amoxicillin-clavulanate (AUGMENTIN) 875-125 MG per tablet; Take 1 tablet by mouth Every 12 (Twelve) Hours.  Dispense: 20 tablet; Refill: 0  -     predniSONE (DELTASONE) 20 MG tablet; Take 2 tablets by mouth Daily for 5 days.  Dispense: 10 tablet; Refill: 0    2. Gastroesophageal reflux disease, unspecified whether esophagitis present  -     omeprazole (priLOSEC) 40 MG capsule; Take 1 capsule by mouth Daily for 30 days. Take 40 mg by mouth 30 minute-1 hour before the last large meal you would eat before going to bed  Dispense: 30 capsule; Refill: 3    3. Recurrent tonsillitis    Impressions and findings were discussed at great length.  Currently, she continues to  spit up foamy/bubbly appearing secretions and also reports a hyperactive gag reflex and surgery.  We reviewed and discussed the results of her 4/12/2023 modified barium swallow study which was concerning for esophageal dysmotility.  We again discussed the differential for symptoms and she will be tried on a course of omeprazole.  If this is not helpful I will refer her to gastroenterology for second opinion.  In regards to her nasal congestion and purulent rhinorrhea she will be placed on a course of Augmentin and prednisone.  We discussed continued supportive measures.  She was given ample time to ask questions, all of which were answered to her satisfaction.    Follow Up   Return in about 2 months (around 6/26/2023).  Patient was given instructions and counseling regarding her condition or for health maintenance advice. Please see specific information pulled into the AVS if appropriate.

## 2023-08-28 ENCOUNTER — TELEPHONE (OUTPATIENT)
Dept: FAMILY MEDICINE CLINIC | Facility: CLINIC | Age: 23
End: 2023-08-28
Payer: OTHER GOVERNMENT

## 2023-08-28 NOTE — TELEPHONE ENCOUNTER
Patient is requesting an updated  referral for her OBGYN.  Has not seen her Primary Care Doctor since March of 2022.  Please advise  [phone]

## 2023-09-28 ENCOUNTER — OFFICE VISIT (OUTPATIENT)
Dept: FAMILY MEDICINE CLINIC | Facility: CLINIC | Age: 23
End: 2023-09-28
Payer: OTHER GOVERNMENT

## 2023-09-28 VITALS
OXYGEN SATURATION: 99 % | HEART RATE: 73 BPM | SYSTOLIC BLOOD PRESSURE: 112 MMHG | BODY MASS INDEX: 45.48 KG/M2 | DIASTOLIC BLOOD PRESSURE: 70 MMHG | HEIGHT: 64 IN | WEIGHT: 266.4 LBS | TEMPERATURE: 97.8 F

## 2023-09-28 DIAGNOSIS — R05.9 COUGH, UNSPECIFIED TYPE: ICD-10-CM

## 2023-09-28 DIAGNOSIS — N93.9 ABNORMAL UTERINE BLEEDING: ICD-10-CM

## 2023-09-28 DIAGNOSIS — J30.9 ALLERGIC RHINITIS, UNSPECIFIED SEASONALITY, UNSPECIFIED TRIGGER: ICD-10-CM

## 2023-09-28 DIAGNOSIS — Z90.89 S/P TONSILLECTOMY: ICD-10-CM

## 2023-09-28 DIAGNOSIS — N89.8 VAGINAL DISCHARGE: ICD-10-CM

## 2023-09-28 DIAGNOSIS — Z23 NEED FOR INFLUENZA VACCINATION: Primary | ICD-10-CM

## 2023-09-28 DIAGNOSIS — N93.9 VAGINAL SPOTTING: ICD-10-CM

## 2023-09-28 LAB
CANDIDA SPECIES: NEGATIVE
EXPIRATION DATE: NORMAL
GARDNERELLA VAGINALIS: POSITIVE
HBV SURFACE AG SERPL QL IA: NORMAL
HCV AB SER DONR QL: NORMAL
HIV 1+2 AB+HIV1 P24 AG SERPL QL IA: NORMAL
INTERNAL CONTROL: NORMAL
Lab: NORMAL
SARS-COV-2 AG UPPER RESP QL IA.RAPID: NOT DETECTED
T VAGINALIS DNA VAG QL PROBE+SIG AMP: NEGATIVE

## 2023-09-28 PROCEDURE — 87340 HEPATITIS B SURFACE AG IA: CPT | Performed by: NURSE PRACTITIONER

## 2023-09-28 PROCEDURE — 86592 SYPHILIS TEST NON-TREP QUAL: CPT | Performed by: NURSE PRACTITIONER

## 2023-09-28 PROCEDURE — 99214 OFFICE O/P EST MOD 30 MIN: CPT | Performed by: FAMILY MEDICINE

## 2023-09-28 PROCEDURE — 87660 TRICHOMONAS VAGIN DIR PROBE: CPT | Performed by: FAMILY MEDICINE

## 2023-09-28 PROCEDURE — 87426 SARSCOV CORONAVIRUS AG IA: CPT | Performed by: FAMILY MEDICINE

## 2023-09-28 PROCEDURE — G0432 EIA HIV-1/HIV-2 SCREEN: HCPCS | Performed by: NURSE PRACTITIONER

## 2023-09-28 PROCEDURE — 87480 CANDIDA DNA DIR PROBE: CPT | Performed by: FAMILY MEDICINE

## 2023-09-28 PROCEDURE — 86803 HEPATITIS C AB TEST: CPT | Performed by: NURSE PRACTITIONER

## 2023-09-28 PROCEDURE — 87510 GARDNER VAG DNA DIR PROBE: CPT | Performed by: FAMILY MEDICINE

## 2023-09-28 RX ORDER — MONTELUKAST SODIUM 10 MG/1
10 TABLET ORAL NIGHTLY
Qty: 90 TABLET | Refills: 1 | Status: SHIPPED | OUTPATIENT
Start: 2023-09-28

## 2023-09-28 RX ORDER — IPRATROPIUM BROMIDE 21 UG/1
2 SPRAY, METERED NASAL 3 TIMES DAILY
Qty: 30 ML | Refills: 1 | Status: SHIPPED | OUTPATIENT
Start: 2023-09-28

## 2023-09-28 RX ORDER — LORATADINE 10 MG/1
10 TABLET ORAL DAILY
Qty: 90 TABLET | Refills: 1 | Status: SHIPPED | OUTPATIENT
Start: 2023-09-28

## 2023-09-28 RX ORDER — METHYLPREDNISOLONE 4 MG/1
TABLET ORAL
Qty: 21 EACH | Refills: 0 | Status: SHIPPED | OUTPATIENT
Start: 2023-09-28

## 2023-09-28 NOTE — PROGRESS NOTES
Chief Complaint  Nasal congestion  Vaginal spotting      Subjective        Berkley Salazar presents to [facility]  History of Present Illness  Patient presents today for an acute visit.  She does have issues with nasal congestion which she has been experiencing for the past 7 days as well as an intermittent cough.  She has been taking DayQuil and NyQuil as well as Benadryl but nothing has been helping.  She has also been taking azelastine nasal spray.  She denies any fever or chills.  She was tested for COVID today which was negative.  She does not report any body aches.  She does have ongoing issues with allergies upon further discussion.  She has had these issues since high school.  She is not currently taking any allergy medicine other than azelastine nasal spray as well as Benadryl most recently.  She previously had issues with tonsillitis.  She eventually did have her tonsils removed on 2/14/2023.  She denies any recurrence of sore throat.  She was seen by Dr. Rider back on 4/26/2023 and was given a prescription for Augmentin which did help out some.  I did discuss with patient that I suspect ongoing issues with allergies and so we discussed treatment accordingly.  She is due for a flu vaccination today so we discussed getting this done.  She has had recurrent issues with vaginal spotting.  She previously had an ultrasound of the pelvis done back on 10/21/2021 that showed a trace amount of anechoic free fluid, likely physiologic with no other sonographic abnormalities seen.  She was previously seen by nurse practitioner, Stefan Flores.  She is requesting to be seen again.  She is not currently on oral contraception.  She was last seen at her office on 11/10/2022.  She was ordered to have STD testing however she did not complete these.  She is also requesting a vaginal swab to be done today.  She has not been sexually active in the past couple months.  I did offer to check a  "pregnancy test however she declines.  Objective   Vital Signs:  /70 (BP Location: Left arm, Patient Position: Sitting)   Pulse 73   Temp 97.8 °F (36.6 °C) (Oral)   Ht 162.6 cm (64\")   Wt 121 kg (266 lb 6.4 oz)   SpO2 99%   BMI 45.73 kg/m²   Estimated body mass index is 45.73 kg/m² as calculated from the following:    Height as of this encounter: 162.6 cm (64\").    Weight as of this encounter: 121 kg (266 lb 6.4 oz).       Class 3 Severe Obesity (BMI >=40). Obesity-related health conditions include the following: none. Obesity is worsening. BMI is is above average; BMI management plan is completed. We discussed portion control and increasing exercise.      Physical Exam   General: AAO ×3, no acute distress, pleasant  HEENT: Normocephalic, atraumatic, no discharge in the eyes, some nasal congestion noted with a transverse crease noted, there is some oropharyngeal erythema with postnasal drip, and TMs intact bilaterally with no erythema, no cervical tenderness or lymphadenopathy  Cardiovascular: Regular rate and rhythm without appreciable murmur  Respiratory: Clear to auscultation bilaterally no RRW  Gastrointestinal: Soft nontender nondistended with bowel sounds present  extremities: No edema  Neurologic: CN II through XII grossly intact   Psychiatric: Normal mood and affect  Result Review :                   Assessment and Plan   Diagnoses and all orders for this visit:    1. Allergic rhinitis, unspecified seasonality, unspecified trigger (Primary)    2. Cough, unspecified type  -     POCT SARS-CoV-2 Antigen JENNY    3. S/P tonsillectomy    4. Vaginal spotting  -     Ambulatory Referral to Obstetrics / Gynecology    5. Abnormal uterine bleeding  -     Ambulatory Referral to Obstetrics / Gynecology    6. Vaginal discharge  -     Gardnerella vaginalis, Trichomonas vaginalis, Candida albicans, DNA - Swab, Vagina    7. Need for influenza vaccination    Other orders  -     ipratropium (ATROVENT) 0.03 % nasal " spray; 2 sprays into the nostril(s) as directed by provider 3 (Three) Times a Day.  Dispense: 30 mL; Refill: 1  -     montelukast (Singulair) 10 MG tablet; Take 1 tablet by mouth Every Night.  Dispense: 90 tablet; Refill: 1  -     loratadine (Claritin) 10 MG tablet; Take 1 tablet by mouth Daily.  Dispense: 90 tablet; Refill: 1  -     methylPREDNISolone (MEDROL) 4 MG dose pack; Take as directed on package instructions.  Dispense: 21 each; Refill: 0    I discussed with patient treatment for her allergies.  I will send in ipratropium nasal spray, Claritin and Singulair.  A Medrol Dosepak is also been sent in.  Patient have labs drawn today.  We discussed referral to OB/GYN in regards to vaginal spotting.  I discussed checking a pregnancy test however she declines this.  I plan to see her back in 6 months or sooner if needed.         Follow Up   Return in about 6 months (around 3/28/2024) for allergic rhinitis.  Patient was given instructions and counseling regarding her condition or for health maintenance advice. Please see specific information pulled into the AVS if appropriate.

## 2023-09-29 LAB — RPR SER QL: NORMAL

## 2023-09-29 RX ORDER — METRONIDAZOLE 500 MG/1
500 TABLET ORAL 2 TIMES DAILY
Qty: 14 TABLET | Refills: 0 | Status: SHIPPED | OUTPATIENT
Start: 2023-09-29 | End: 2023-10-06

## 2023-10-04 ENCOUNTER — OFFICE VISIT (OUTPATIENT)
Dept: OBSTETRICS AND GYNECOLOGY | Facility: CLINIC | Age: 23
End: 2023-10-04
Payer: OTHER GOVERNMENT

## 2023-10-04 VITALS
BODY MASS INDEX: 46.86 KG/M2 | WEIGHT: 273 LBS | SYSTOLIC BLOOD PRESSURE: 127 MMHG | HEART RATE: 64 BPM | DIASTOLIC BLOOD PRESSURE: 81 MMHG

## 2023-10-04 DIAGNOSIS — N92.1 MENORRHAGIA WITH IRREGULAR CYCLE: Primary | ICD-10-CM

## 2023-10-04 LAB
ALBUMIN SERPL-MCNC: 3.7 G/DL (ref 3.5–5.2)
ALBUMIN/GLOB SERPL: 1.3 G/DL
ALP SERPL-CCNC: 68 U/L (ref 39–117)
ALT SERPL W P-5'-P-CCNC: 10 U/L (ref 1–33)
ANION GAP SERPL CALCULATED.3IONS-SCNC: 7.4 MMOL/L (ref 5–15)
AST SERPL-CCNC: 9 U/L (ref 1–32)
BILIRUB SERPL-MCNC: 0.5 MG/DL (ref 0–1.2)
BUN SERPL-MCNC: 13 MG/DL (ref 6–20)
BUN/CREAT SERPL: 18.3 (ref 7–25)
CALCIUM SPEC-SCNC: 8.8 MG/DL (ref 8.6–10.5)
CHLORIDE SERPL-SCNC: 107 MMOL/L (ref 98–107)
CO2 SERPL-SCNC: 25.6 MMOL/L (ref 22–29)
CREAT SERPL-MCNC: 0.71 MG/DL (ref 0.57–1)
DEPRECATED RDW RBC AUTO: 44.3 FL (ref 37–54)
EGFRCR SERPLBLD CKD-EPI 2021: 124.2 ML/MIN/1.73
ERYTHROCYTE [DISTWIDTH] IN BLOOD BY AUTOMATED COUNT: 18.3 % (ref 12.3–15.4)
GLOBULIN UR ELPH-MCNC: 2.8 GM/DL
GLUCOSE SERPL-MCNC: 94 MG/DL (ref 65–99)
HBA1C MFR BLD: 5 % (ref 4.8–5.6)
HCT VFR BLD AUTO: 37.1 % (ref 34–46.6)
HGB BLD-MCNC: 11.9 G/DL (ref 12–15.9)
MCH RBC QN AUTO: 22.2 PG (ref 26.6–33)
MCHC RBC AUTO-ENTMCNC: 32.1 G/DL (ref 31.5–35.7)
MCV RBC AUTO: 69.3 FL (ref 79–97)
PLATELET # BLD AUTO: 301 10*3/MM3 (ref 140–450)
PMV BLD AUTO: 9.5 FL (ref 6–12)
POTASSIUM SERPL-SCNC: 4.2 MMOL/L (ref 3.5–5.2)
PROT SERPL-MCNC: 6.5 G/DL (ref 6–8.5)
RBC # BLD AUTO: 5.35 10*6/MM3 (ref 3.77–5.28)
SODIUM SERPL-SCNC: 140 MMOL/L (ref 136–145)
T4 FREE SERPL-MCNC: 1.47 NG/DL (ref 0.93–1.7)
TSH SERPL DL<=0.05 MIU/L-ACNC: 2.08 UIU/ML (ref 0.27–4.2)
WBC NRBC COR # BLD: 12.34 10*3/MM3 (ref 3.4–10.8)

## 2023-10-04 PROCEDURE — 84443 ASSAY THYROID STIM HORMONE: CPT | Performed by: NURSE PRACTITIONER

## 2023-10-04 PROCEDURE — 80053 COMPREHEN METABOLIC PANEL: CPT | Performed by: NURSE PRACTITIONER

## 2023-10-04 PROCEDURE — 83036 HEMOGLOBIN GLYCOSYLATED A1C: CPT | Performed by: NURSE PRACTITIONER

## 2023-10-04 PROCEDURE — 84439 ASSAY OF FREE THYROXINE: CPT | Performed by: NURSE PRACTITIONER

## 2023-10-04 PROCEDURE — 85027 COMPLETE CBC AUTOMATED: CPT | Performed by: NURSE PRACTITIONER

## 2023-10-04 NOTE — PROGRESS NOTES
GYN Visit    CC:   Chief Complaint   Patient presents with    Menstrual Problem     Spotting for the past few months        HPI:   23 y.o.No obstetric history on file. Contraception or HRT: Contraception:  None    Has been spotting for the past two and a half months.  Before this, had not had a cycle since March or April.   Not using any birth control.  Would like for her bleeding to stop.     History: PMHx, Meds, Allergies, PSHx, Social Hx, and POBHx all reviewed and updated.    Review of Systems   Constitutional: Negative.    Genitourinary:  Positive for menstrual problem.     PHYSICAL EXAM:  /81   Pulse 64   Wt 124 kg (273 lb)   LMP 06/26/2023 (Approximate)   BMI 46.86 kg/m²      Physical Exam  Vitals and nursing note reviewed.   Constitutional:       Appearance: Normal appearance. She is well-developed and well-groomed.   Neck:      Comments: Acanthosis nigricans noted  Neurological:      Mental Status: She is alert.   Psychiatric:         Attention and Perception: Attention and perception normal.         Mood and Affect: Affect normal.         Speech: Speech normal.         Behavior: Behavior is cooperative.         Cognition and Memory: Cognition normal.       ASSESSMENT AND PLAN:  Diagnoses and all orders for this visit:    1. Menorrhagia with irregular cycle (Primary)  Assessment & Plan:  Patient has been spotting for past 2-3 months.  Prior to this, had not had a cycle since March or April.  Exam is significant for acahthosis nigricans on her neck.  Suspect PCOS/metabolic syndrome.  Will check labs and ultrasound.  Patient is willing to try an OCP, will discuss further at follow up.     Orders:  -     CBC (No Diff)  -     Comprehensive Metabolic Panel  -     Hemoglobin A1c  -     Sex Horm Binding Globulin  -     T4, Free  -     TSH  -     US Non-ob Transvaginal; Future        Counseling:  TRACK MENSES, RTO if <q21 days (frequent) or >q3mo (infrequent IF not on hormonal BC), >7d long, heavy, or  painful.    PCOS- Discussed Dx, Tx, weight, pregnancy, periods/anovulation, cholesterol, insulin, and uterine cancer risk discussed w pt.    Follow Up:  Return in about 1 month (around 11/4/2023) for Annual physical and ultrasound follow up.        Stefan Folres, APRN  10/04/2023    [facility] [place]  [facility]  [address] [place] [address]  Dept: [phone]  Dept Fax: [phone]  Loc: [phone]

## 2023-10-04 NOTE — ASSESSMENT & PLAN NOTE
Patient has been spotting for past 2-3 months.  Prior to this, had not had a cycle since March or April.  Exam is significant for acahthosis nigricans on her neck.  Suspect PCOS/metabolic syndrome.  Will check labs and ultrasound.  Patient is willing to try an OCP, will discuss further at follow up.

## 2023-10-05 ENCOUNTER — TELEPHONE (OUTPATIENT)
Dept: OBSTETRICS AND GYNECOLOGY | Facility: CLINIC | Age: 23
End: 2023-10-05
Payer: OTHER GOVERNMENT

## 2023-10-05 LAB — SHBG SERPL-SCNC: 16.2 NMOL/L (ref 24.6–122)

## 2023-10-05 NOTE — TELEPHONE ENCOUNTER
----- Message from PAULINO Gallegos sent at 10/5/2023 12:41 PM EDT -----  Please let patient know her labs are normal except her SHBG is slightly low indicating increased testosterone or insulin or both.  Would recommend she tweak her nutrition to focus on lean protein, green veggies and complex carbs.  Avoid simple carbs and fast/fried foods.  Will follow up after ultrasound.

## 2023-10-18 ENCOUNTER — HOSPITAL ENCOUNTER (OUTPATIENT)
Dept: ULTRASOUND IMAGING | Facility: HOSPITAL | Age: 23
Discharge: HOME | End: 2023-10-18
Admitting: NURSE PRACTITIONER
Payer: OTHER GOVERNMENT

## 2023-10-18 DIAGNOSIS — N92.1 MENORRHAGIA WITH IRREGULAR CYCLE: ICD-10-CM

## 2023-10-18 PROCEDURE — 76830 TRANSVAGINAL US NON-OB: CPT

## 2023-10-19 ENCOUNTER — TELEPHONE (OUTPATIENT)
Dept: OBSTETRICS AND GYNECOLOGY | Facility: CLINIC | Age: 23
End: 2023-10-19
Payer: OTHER GOVERNMENT

## 2023-10-19 NOTE — TELEPHONE ENCOUNTER
Informed patient that her ultrasound shows a thickened endometrium, otherwise normal.  Recommend she keep her scheduled follow up appointment and we will plan a follow up ultrasound to re-evaluate. Patient has a follow up appointment scheduled and she is aware of date and time.

## 2023-10-19 NOTE — TELEPHONE ENCOUNTER
----- Message from PAULINO Gallegos sent at 10/18/2023  1:12 PM EDT -----  Please let patient know her ultrasound shows a thickened endometrium, otherwise normal.  Recommend she keep her scheduled follow up appointment, may move sooner if desired.  Will plan a follow up ultrasound to re-evaluate.

## 2023-10-26 ENCOUNTER — OFFICE VISIT (OUTPATIENT)
Dept: OBSTETRICS AND GYNECOLOGY | Facility: CLINIC | Age: 23
End: 2023-10-26
Payer: OTHER GOVERNMENT

## 2023-10-26 VITALS
HEIGHT: 64 IN | SYSTOLIC BLOOD PRESSURE: 132 MMHG | BODY MASS INDEX: 47.05 KG/M2 | WEIGHT: 275.6 LBS | DIASTOLIC BLOOD PRESSURE: 86 MMHG | HEART RATE: 80 BPM

## 2023-10-26 DIAGNOSIS — Z01.419 ENCOUNTER FOR GYNECOLOGICAL EXAMINATION WITHOUT ABNORMAL FINDING: Primary | ICD-10-CM

## 2023-10-26 DIAGNOSIS — N92.1 MENORRHAGIA WITH IRREGULAR CYCLE: ICD-10-CM

## 2023-10-26 PROCEDURE — G0123 SCREEN CERV/VAG THIN LAYER: HCPCS

## 2023-10-26 RX ORDER — LEVONORGESTREL AND ETHINYL ESTRADIOL 0.15-0.03
1 KIT ORAL DAILY
Qty: 84 TABLET | Refills: 3 | Status: SHIPPED | OUTPATIENT
Start: 2023-10-26 | End: 2024-10-25

## 2023-10-26 NOTE — ASSESSMENT & PLAN NOTE
Ultrasound shows thickened endometrium, otherwise normal.  Suspect PCOS/metabolic syndrome.  Discussed PCOS and risks if left untreated.  Discussed management options to include lifestyle changes and OCPs.  Patient would like to try OCPs, will work on lifestyle modifications.  Will order follow up ultrasound.

## 2023-10-26 NOTE — PROGRESS NOTES
"Well Woman Visit    CC: Scheduled annual well gyn visit  Chief Complaint   Patient presents with    Gynecologic Exam     Also FU US           HPI:   23 y.o.   Social History     Substance and Sexual Activity   Sexual Activity Not Currently    Partners: Male    Birth control/protection: None       Menses:   has been bleeding for three months     Patient is 25 yo or younger and DECLINES GC/CT testing.  She understands she could be asymptomatic and it could affect future fertility    Patient has PCP  History: PMHx, Meds, Allergies, PSHx, Social Hx, and POBHx all reviewed and updated.    Bleeding issues: Has been bleeding for about 3 months now, concerned for PCOS.  Reviewed recent ultrasound which shows thickened endometrium.  Discussed management options.  US Non-ob Transvaginal (10/18/2023 08:24)  PHYSICAL EXAM:  /86   Pulse 80   Ht 162.6 cm (64\")   Wt 125 kg (275 lb 9.6 oz)   LMP 10/26/2023 (Approximate) Comment: Pt states that she has been continously bleeding for 3 months  BMI 47.31 kg/m²  Not found.  General- NAD, alert and oriented, appropriate  Psych- Normal mood, good memory  Neck- No masses, no thyroid enlargement  CV- Regular rhythm, no murnurs  Resp- CTA to bases, no wheezes  Abdomen- Soft, non distended, non tender, no masses    Breast left-  Bilaterally symmetrical, no masses, non tender, no nipple discharge  Breast right- Bilaterally symmetrical, no masses, non tender, no nipple discharge    External genitalia- Normal female, no lesions  Urethra/meatus- Normal, no masses, non tender  Bladder- Normal, no masses, non tender  Vagina- Normal, no atrophy, no lesions, no discharge.  Prolapse : none noted, not examined with split speculum to delineate  Cvx- Normal, no lesions, no discharge, No cervical motion tenderness  Uterus- Normal size, shape & consistency.  Non tender, mobile, & no prolapse  Adnexa- No mass, non tender  Anus/Rectum/Perineum- Not performed    Lymphatic- No palpable neck, " axillary, or groin nodes  Ext- No edema, no cyanosis    Skin- No lesions, no rashes, no acanthosis nigricans      ASSESSMENT and PLAN:    Diagnoses and all orders for this visit:    1. Encounter for gynecological examination without abnormal finding (Primary)  -     IGP,rfx Aptima HPV All Pth    2. Menorrhagia with irregular cycle  Assessment & Plan:  Ultrasound shows thickened endometrium, otherwise normal.  Suspect PCOS/metabolic syndrome.  Discussed PCOS and risks if left untreated.  Discussed management options to include lifestyle changes and OCPs.  Patient would like to try OCPs, will work on lifestyle modifications.  Will order follow up ultrasound.      Orders:  -     levonorgestrel-ethinyl estradiol (NORDETTE) 0.15-30 MG-MCG per tablet; Take 1 tablet by mouth Daily.  Dispense: 84 tablet; Refill: 3  -     US Non-ob Transvaginal; Future        Preventative:  BREAST HEALTH- Monthly self breast exam importance and how to reviewed. MMG and/or MRI (prn) reviewed per society guidelines and her individual history. Screen: Not medically needed  CERVICAL CANCER Screening- Reviewed current ASCCP guidelines for screening w and wo cotest HPV, age specific.  Screen: Updated today  SEXUAL HEALTH: Declines STD screening  VACCINATIONS Recommended: Covid vaccine, Flu annually.  Importance discussed, risk being unvaccinated reviewed.  Questions answered  Smoking status- NON SMOKER    TRACK MENSES, RTO if <q21 days (frequent) or >q3mo (infrequent IF not on hormonal BC), >7d long, heavy, or painful.    PCOS- Discussed Dx, Tx, weight, pregnancy, periods/anovulation, cholesterol, insulin, and uterine cancer risk discussed w pt.    She understands the importance of having any ordered tests to be performed in a timely fashion.  The risks of not performing them include, but are not limited to, advanced cancer stages, bone loss from osteoporosis and/or subsequent increase in morbidity and/or mortality.  She is encouraged to review  her results online and/or contact or office if she has questions.     Follow Up:  Return in about 4 months (around 2/26/2024) for Next scheduled follow up.        Stefan Flores, APRN  10/26/2023    [facility] [place] [facility] [address]  [facility]  [address] [place] [address]  Dept: [phone]  Dept Fax: [phone]  Loc: [phone]

## 2023-10-30 LAB
CONV .: NORMAL
CYTOLOGIST CVX/VAG CYTO: NORMAL
CYTOLOGY CVX/VAG DOC CYTO: NORMAL
CYTOLOGY CVX/VAG DOC THIN PREP: NORMAL
DX ICD CODE: NORMAL
HIV 1 & 2 AB SER-IMP: NORMAL
OTHER STN SPEC: NORMAL
STAT OF ADQ CVX/VAG CYTO-IMP: NORMAL

## 2023-11-27 RX ORDER — IPRATROPIUM BROMIDE 21 UG/1
SPRAY, METERED NASAL
Qty: 30 ML | Refills: 1 | Status: SHIPPED | OUTPATIENT
Start: 2023-11-27

## 2023-12-27 ENCOUNTER — HOSPITAL ENCOUNTER (OUTPATIENT)
Dept: ULTRASOUND IMAGING | Facility: HOSPITAL | Age: 23
Discharge: HOME | End: 2023-12-27
Admitting: NURSE PRACTITIONER
Payer: OTHER GOVERNMENT

## 2023-12-27 DIAGNOSIS — N92.1 MENORRHAGIA WITH IRREGULAR CYCLE: ICD-10-CM

## 2023-12-27 PROCEDURE — 76830 TRANSVAGINAL US NON-OB: CPT

## 2024-01-02 ENCOUNTER — TELEPHONE (OUTPATIENT)
Dept: OBSTETRICS AND GYNECOLOGY | Facility: CLINIC | Age: 24
End: 2024-01-02
Payer: OTHER GOVERNMENT

## 2024-01-02 NOTE — TELEPHONE ENCOUNTER
----- Message from PAULINO Gallegos sent at 1/2/2024  3:41 PM EST -----  Please let patient know her ultrasound shows her endometrium is no longer thickened, now measures about 0.7 cm.  Recommend she keep her scheduled follow up appointment.

## 2024-03-11 ENCOUNTER — OFFICE VISIT (OUTPATIENT)
Dept: OBSTETRICS AND GYNECOLOGY | Facility: CLINIC | Age: 24
End: 2024-03-11
Payer: OTHER GOVERNMENT

## 2024-03-11 VITALS
SYSTOLIC BLOOD PRESSURE: 122 MMHG | WEIGHT: 276 LBS | DIASTOLIC BLOOD PRESSURE: 79 MMHG | BODY MASS INDEX: 47.38 KG/M2 | HEART RATE: 80 BPM

## 2024-03-11 DIAGNOSIS — N92.1 MENORRHAGIA WITH IRREGULAR CYCLE: Primary | ICD-10-CM

## 2024-03-11 PROBLEM — N91.2 AMENORRHEA: Status: RESOLVED | Noted: 2021-09-14 | Resolved: 2024-03-11

## 2024-03-11 PROCEDURE — 99212 OFFICE O/P EST SF 10 MIN: CPT | Performed by: NURSE PRACTITIONER

## 2024-03-11 NOTE — PROGRESS NOTES
GYN Visit    CC:   Chief Complaint   Patient presents with    Follow-up     4 month medication follow up        HPI:   23 y.o. Contraception or HRT: Contraception:  Birth control pill    Patient is here for follow up on heavy cycles and OCPs.  Cycle is now monthly, lasting about 6 days, with 3 heavy days.    Has cramping on the first two days, ibuprofen will manage her pain.     Happy with current OCPs and desires to continue.    History: PMHx, Meds, Allergies, PSHx, Social Hx, and POBHx all reviewed and updated.    Review of Systems   Constitutional: Negative.    Genitourinary: Negative.        PHYSICAL EXAM:  /79   Pulse 80   Wt 125 kg (276 lb)   BMI 47.38 kg/m²      Physical Exam  Vitals and nursing note reviewed.   Constitutional:       Appearance: Normal appearance. She is well-developed and well-groomed.   Neurological:      Mental Status: She is alert.   Psychiatric:         Attention and Perception: Attention and perception normal.         Mood and Affect: Affect normal.         Speech: Speech normal.         Behavior: Behavior is cooperative.         Cognition and Memory: Cognition normal.         ASSESSMENT AND PLAN:  Diagnoses and all orders for this visit:    1. Menorrhagia with irregular cycle (Primary)  Assessment & Plan:  Cycle is regular with current OCPs, patient is happy with management and desires to continue          Counseling:  TRACK MENSES, RTO if <q21 days (frequent) or >q3mo (infrequent IF not on hormonal BC), >7d long, heavy, or painful.      Follow Up:  Return in about 8 months (around 2024) for Annual physical.        Stefan Flores, APRN  2024    [facility] [address]  [facility]  [address] [place] [address]  Dept: [phone]  Dept Fax: [phone]  Loc: [phone]

## 2024-05-09 ENCOUNTER — TELEPHONE (OUTPATIENT)
Dept: FAMILY MEDICINE CLINIC | Facility: CLINIC | Age: 24
End: 2024-05-09
Payer: OTHER GOVERNMENT

## 2024-06-14 ENCOUNTER — TELEPHONE (OUTPATIENT)
Dept: OBSTETRICS AND GYNECOLOGY | Facility: CLINIC | Age: 24
End: 2024-06-14
Payer: OTHER GOVERNMENT

## 2024-06-26 ENCOUNTER — OFFICE VISIT (OUTPATIENT)
Dept: OBSTETRICS AND GYNECOLOGY | Facility: CLINIC | Age: 24
End: 2024-06-26
Payer: OTHER GOVERNMENT

## 2024-06-26 VITALS
BODY MASS INDEX: 48.23 KG/M2 | DIASTOLIC BLOOD PRESSURE: 79 MMHG | SYSTOLIC BLOOD PRESSURE: 117 MMHG | HEART RATE: 87 BPM | WEIGHT: 281 LBS

## 2024-06-26 DIAGNOSIS — N89.8 VAGINAL ITCHING: ICD-10-CM

## 2024-06-26 DIAGNOSIS — R35.0 URINARY FREQUENCY: Primary | ICD-10-CM

## 2024-06-26 PROCEDURE — 99213 OFFICE O/P EST LOW 20 MIN: CPT | Performed by: NURSE PRACTITIONER

## 2024-06-26 PROCEDURE — 87086 URINE CULTURE/COLONY COUNT: CPT | Performed by: NURSE PRACTITIONER

## 2024-06-26 PROCEDURE — 87591 N.GONORRHOEAE DNA AMP PROB: CPT | Performed by: NURSE PRACTITIONER

## 2024-06-26 PROCEDURE — 87801 DETECT AGNT MULT DNA AMPLI: CPT | Performed by: NURSE PRACTITIONER

## 2024-06-26 PROCEDURE — 87798 DETECT AGENT NOS DNA AMP: CPT | Performed by: NURSE PRACTITIONER

## 2024-06-26 PROCEDURE — 87491 CHLMYD TRACH DNA AMP PROBE: CPT | Performed by: NURSE PRACTITIONER

## 2024-06-26 PROCEDURE — 87563 M. GENITALIUM AMP PROBE: CPT | Performed by: NURSE PRACTITIONER

## 2024-06-26 PROCEDURE — 87661 TRICHOMONAS VAGINALIS AMPLIF: CPT | Performed by: NURSE PRACTITIONER

## 2024-06-26 PROCEDURE — 99459 PELVIC EXAMINATION: CPT | Performed by: NURSE PRACTITIONER

## 2024-06-26 RX ORDER — DIAPER,BRIEF,INFANT-TODD,DISP
EACH MISCELLANEOUS
COMMUNITY
Start: 2024-05-14

## 2024-06-26 RX ORDER — FLUCONAZOLE 150 MG/1
150 TABLET ORAL ONCE
Qty: 1 TABLET | Refills: 0 | Status: SHIPPED | OUTPATIENT
Start: 2024-06-26 | End: 2024-06-26

## 2024-06-26 NOTE — PROGRESS NOTES
GYN Visit    CC:   Chief Complaint   Patient presents with    Follow-up     Sti screen        HPI:   24 y.o. Contraception or HRT: Contraception:  Birth control pill    Patient is here with concerns today.  Has been having urinary frequency and itching for the past few days.  Has had BV twice in the past month.  Wants to be screened for STIs.     History: PMHx, Meds, Allergies, PSHx, Social Hx, and POBHx all reviewed and updated.    Review of Systems   Constitutional: Negative.    Genitourinary:  Positive for frequency.        Vaginal itching       PHYSICAL EXAM:  /79   Pulse 87   Wt 127 kg (281 lb)   BMI 48.23 kg/m²      Physical Exam  Vitals and nursing note reviewed. Exam conducted with a chaperone present.   Constitutional:       Appearance: Normal appearance. She is well-developed and well-groomed.   HENT:      Head: Normocephalic.   Eyes:      Pupils: Pupils are equal, round, and reactive to light.   Genitourinary:     General: Normal vulva.      Exam position: Lithotomy position.      Pubic Area: No rash or pubic lice.       Labia:         Right: No rash, tenderness, lesion or injury.         Left: No rash, tenderness, lesion or injury.       Vagina: Normal. No foreign body. No vaginal discharge, erythema, tenderness, bleeding or lesions.      Cervix: No cervical motion tenderness or discharge.   Skin:     General: Skin is warm and dry.   Neurological:      General: No focal deficit present.      Mental Status: She is alert.         ASSESSMENT AND PLAN:  Diagnoses and all orders for this visit:    1. Urinary frequency (Primary)  -     Urine Culture - Urine, Urine, Clean Catch    2. Vaginal itching  -     Genital Mycoplasmas JOEL, Swab - Swab, Cervix  -     NuSwab VG+ - Swab, Cervix  -     fluconazole (Diflucan) 150 MG tablet; Take 1 tablet by mouth 1 (One) Time for 1 dose.  Dispense: 1 tablet; Refill: 0        Counseling:  Will treat for yeast while awaiting swab/culture results    Follow  Up:  Return for as needed.        Stefan Flores, APRN  06/26/2024    [facility] [place] [facility] [address]  [facility]  [address] [place] [address]  Dept: [phone]  Dept Fax: [phone]  Loc: [phone]

## 2024-06-28 DIAGNOSIS — B37.9 CANDIDIASIS: Primary | ICD-10-CM

## 2024-06-28 LAB
A VAGINAE DNA VAG QL NAA+PROBE: ABNORMAL SCORE
BACTERIA SPEC AEROBE CULT: NORMAL
BVAB2 DNA VAG QL NAA+PROBE: ABNORMAL SCORE
C ALBICANS DNA VAG QL NAA+PROBE: NEGATIVE
C GLABRATA DNA VAG QL NAA+PROBE: POSITIVE
C TRACH DNA SPEC QL NAA+PROBE: NEGATIVE
MEGA1 DNA VAG QL NAA+PROBE: ABNORMAL SCORE
N GONORRHOEA DNA VAG QL NAA+PROBE: NEGATIVE
T VAGINALIS DNA VAG QL NAA+PROBE: NEGATIVE

## 2024-06-28 RX ORDER — CLOTRIMAZOLE 1 %
CREAM WITH APPLICATOR VAGINAL NIGHTLY
Qty: 45 G | Refills: 0 | Status: SHIPPED | OUTPATIENT
Start: 2024-06-28

## 2024-06-30 LAB
M GENITALIUM DNA SPEC QL NAA+PROBE: POSITIVE
M HOMINIS DNA SPEC QL NAA+PROBE: NEGATIVE
UREAPLASMA DNA SPEC QL NAA+PROBE: POSITIVE

## 2024-07-01 DIAGNOSIS — A49.3 INFECTION DUE TO MYCOPLASMA GENITALIUM: Primary | ICD-10-CM

## 2024-07-01 RX ORDER — DOXYCYCLINE 100 MG/1
100 CAPSULE ORAL 2 TIMES DAILY
Qty: 14 CAPSULE | Refills: 0 | Status: SHIPPED | OUTPATIENT
Start: 2024-07-01 | End: 2024-07-08

## 2024-07-01 RX ORDER — AZITHROMYCIN 500 MG/1
TABLET, FILM COATED ORAL
Qty: 5 TABLET | Refills: 0 | Status: SHIPPED | OUTPATIENT
Start: 2024-07-01

## 2024-08-13 ENCOUNTER — OFFICE VISIT (OUTPATIENT)
Dept: OBSTETRICS AND GYNECOLOGY | Facility: CLINIC | Age: 24
End: 2024-08-13
Payer: OTHER GOVERNMENT

## 2024-08-13 VITALS
WEIGHT: 287 LBS | BODY MASS INDEX: 49.26 KG/M2 | SYSTOLIC BLOOD PRESSURE: 119 MMHG | DIASTOLIC BLOOD PRESSURE: 79 MMHG | HEART RATE: 85 BPM

## 2024-08-13 DIAGNOSIS — Z11.3 ROUTINE SCREENING FOR STI (SEXUALLY TRANSMITTED INFECTION): Primary | ICD-10-CM

## 2024-08-13 DIAGNOSIS — N92.1 MENORRHAGIA WITH IRREGULAR CYCLE: ICD-10-CM

## 2024-08-13 RX ORDER — LEVONORGESTREL AND ETHINYL ESTRADIOL 0.15-0.03
1 KIT ORAL DAILY
Qty: 84 TABLET | Refills: 3 | Status: SHIPPED | OUTPATIENT
Start: 2024-08-13

## 2024-08-13 NOTE — PROGRESS NOTES
GYN Visit    CC:   Chief Complaint   Patient presents with    Follow-up     SALBADOR        HPI:   24 y.o. Contraception or HRT: Contraception:  Birth control pill    Patient is here for repeat mycoplasma screening for test of cure.  No other concerns.     History: PMHx, Meds, Allergies, PSHx, Social Hx, and POBHx all reviewed and updated.    Review of Systems   Constitutional: Negative.    Genitourinary: Negative.        PHYSICAL EXAM:  /79   Pulse 85   Wt 130 kg (287 lb)   BMI 49.26 kg/m²      Physical Exam  Vitals and nursing note reviewed. Exam conducted with a chaperone present.   Constitutional:       Appearance: Normal appearance. She is well-developed and well-groomed.   HENT:      Head: Normocephalic.   Eyes:      Pupils: Pupils are equal, round, and reactive to light.   Genitourinary:     General: Normal vulva.      Exam position: Lithotomy position.      Pubic Area: No rash or pubic lice.       Labia:         Right: No rash, tenderness, lesion or injury.         Left: No rash, tenderness, lesion or injury.       Vagina: Normal. No foreign body. No vaginal discharge, erythema, tenderness, bleeding or lesions.      Cervix: No cervical motion tenderness or discharge.   Skin:     General: Skin is warm and dry.   Neurological:      General: No focal deficit present.      Mental Status: She is alert.         ASSESSMENT AND PLAN:  Diagnoses and all orders for this visit:    1. Routine screening for STI (sexually transmitted infection) (Primary)  -     Cancel: Chlamydia trachomatis, Neisseria gonorrhoeae, Trichomonas vaginalis, PCR - Swab, Cervix  -     Genital Mycoplasmas JOEL, Swab - Swab, Cervix        Counseling:  Will call with any needed treatment    Follow Up:  Return for as needed.          Stefan Flores, APRN  2024    [facility] [place] [facility] [address]  [facility]  [address]  [place] [address]  Dept: [phone]  Dept Fax: [phone]  Loc: [phone]

## 2024-08-19 DIAGNOSIS — A49.3 INFECTION DUE TO MYCOPLASMA GENITALIUM: Primary | ICD-10-CM

## 2024-08-19 RX ORDER — MOXIFLOXACIN HYDROCHLORIDE 400 MG/1
400 TABLET ORAL DAILY
Qty: 7 TABLET | Refills: 0 | Status: SHIPPED | OUTPATIENT
Start: 2024-08-19 | End: 2024-08-26

## 2024-08-19 RX ORDER — DOXYCYCLINE 100 MG/1
100 CAPSULE ORAL 2 TIMES DAILY
Qty: 14 CAPSULE | Refills: 0 | Status: SHIPPED | OUTPATIENT
Start: 2024-08-19 | End: 2024-08-26

## 2025-02-18 ENCOUNTER — OFFICE VISIT (OUTPATIENT)
Dept: OBSTETRICS AND GYNECOLOGY | Facility: CLINIC | Age: 25
End: 2025-02-18
Payer: COMMERCIAL

## 2025-02-18 VITALS
BODY MASS INDEX: 41.95 KG/M2 | WEIGHT: 293 LBS | HEART RATE: 93 BPM | SYSTOLIC BLOOD PRESSURE: 131 MMHG | HEIGHT: 70 IN | DIASTOLIC BLOOD PRESSURE: 82 MMHG

## 2025-02-18 DIAGNOSIS — N92.1 MENORRHAGIA WITH IRREGULAR CYCLE: ICD-10-CM

## 2025-02-18 DIAGNOSIS — Z01.419 ENCOUNTER FOR GYNECOLOGICAL EXAMINATION WITHOUT ABNORMAL FINDING: Primary | ICD-10-CM

## 2025-02-18 PROCEDURE — G0123 SCREEN CERV/VAG THIN LAYER: HCPCS | Performed by: NURSE PRACTITIONER

## 2025-02-18 PROCEDURE — 87591 N.GONORRHOEAE DNA AMP PROB: CPT | Performed by: NURSE PRACTITIONER

## 2025-02-18 PROCEDURE — 87491 CHLMYD TRACH DNA AMP PROBE: CPT | Performed by: NURSE PRACTITIONER

## 2025-02-18 PROCEDURE — 87563 M. GENITALIUM AMP PROBE: CPT | Performed by: NURSE PRACTITIONER

## 2025-02-18 RX ORDER — LEVONORGESTREL AND ETHINYL ESTRADIOL 0.15-0.03
1 KIT ORAL DAILY
Qty: 84 TABLET | Refills: 3 | Status: SHIPPED | OUTPATIENT
Start: 2025-02-18

## 2025-02-18 NOTE — PROGRESS NOTES
"Well Woman Visit    CC: Scheduled annual well gyn visit  Chief Complaint   Patient presents with    Gynecologic Exam     wwe       HPI:   24 y.o.   Social History     Substance and Sexual Activity   Sexual Activity Not Currently    Partners: Male    Birth control/protection: None       Menses:   q 28-30 days, lasts 3-4 days, changes products q 3-4 hrs on heaviest days.   Pain with menses:  Mild, OTC meds control discomfort  Patient is 25 yo or younger and DESIRES GC/CT testing today    PCP: does not have PCP, needs PCP  History: PMHx, Meds, Allergies, PSHx, Social Hx, and POBHx all reviewed and updated.    Pt has no complaints today.    PHYSICAL EXAM:  /82   Pulse 93   Ht 177.8 cm (70\")   Wt 134 kg (296 lb)   LMP 02/15/2025 (Exact Date)   Breastfeeding No   BMI 42.47 kg/m²  Not found.     Exam conducted with a chaperone present  General- NAD, alert and oriented, appropriate  Psych- Normal mood, good memory  Neck- No masses, no thyroid enlargement  CV- Regular rhythm, no murnurs  Resp- CTA to bases, no wheezes  Abdomen- Soft, non distended, non tender, no masses    Breast left-  Bilaterally symmetrical, no masses, non tender, no nipple discharge  Breast right- Bilaterally symmetrical, no masses, non tender, no nipple discharge    External genitalia- Normal female, no lesions  Urethra/meatus- Normal, no masses, non tender  Bladder- Normal, no masses, non tender  Vagina- Normal, no atrophy, no lesions, no discharge.  Prolapse : none noted   Cvx- Normal, no lesions, no discharge, No cervical motion tenderness  Uterus- Normal size, shape & consistency.  Non tender, mobile.  Adnexa- No mass, non tender  Anus/Rectum/Perineum- Not performed    Lymphatic- No palpable neck, axillary, or groin nodes  Ext- No edema, no cyanosis    Skin- No lesions, no rashes, no acanthosis nigricans      ASSESSMENT and PLAN:    Diagnoses and all orders for this visit:    1. Encounter for gynecological examination without " abnormal finding (Primary)  -     IGP,rfx Aptima HPV All Pth  -     Ct, Ng, M genitalium JOEL, Swab  -     Ambulatory Referral to Family Practice    2. Menorrhagia with irregular cycle  Assessment & Plan:  Cycle is regular, patient is happy with current OCPs, desires to continue.    Orders:  -     levonorgestrel-ethinyl estradiol (Kurvelo) 0.15-30 MG-MCG per tablet; Take 1 tablet by mouth Daily.  Dispense: 84 tablet; Refill: 3        Preventative:  BREAST HEALTH- Monthly self breast exam importance and how to reviewed. MMG and/or MRI (prn) reviewed per society guidelines and her individual history. Screen: Not medically needed  CERVICAL CANCER Screening- Reviewed current ASCCP guidelines for screening w and wo cotest HPV, age specific.  Screen: Updated today  SEXUAL HEALTH: STD screening recommended.  Ordered  VACCINATIONS Recommended: Covid vaccine, Flu annually.  Importance discussed, risk being unvaccinated reviewed.  Questions answered  Smoking status- NON SMOKER/VAPER        She understands the importance of having any ordered tests to be performed in a timely fashion.  The risks of not performing them include, but are not limited to, advanced cancer stages, bone loss from osteoporosis and/or subsequent increase in morbidity and/or mortality.  She is encouraged to review her results online and/or contact or office if she has questions.     Follow Up:  Return in about 1 year (around 2/18/2026) for Annual physical.        Stefan Flores, APRN  02/18/2025    [facility] [place] [facility] [address]  [facility]  [address] [place] [address]  Dept: [phone]  Dept Fax: [phone]  Loc: [phone]

## 2025-02-21 LAB
CONV .: NORMAL
CYTOLOGIST CVX/VAG CYTO: NORMAL
CYTOLOGY CVX/VAG DOC CYTO: NORMAL
CYTOLOGY CVX/VAG DOC THIN PREP: NORMAL
DX ICD CODE: NORMAL
OTHER STN SPEC: NORMAL
SERVICE CMNT-IMP: NORMAL
STAT OF ADQ CVX/VAG CYTO-IMP: NORMAL

## 2025-02-24 DIAGNOSIS — A49.3 INFECTION DUE TO MYCOPLASMA GENITALIUM: Primary | ICD-10-CM

## 2025-02-24 LAB
C TRACH DNA SPEC QL NAA+PROBE: NEGATIVE
M GENITALIUM DNA SPEC QL NAA+PROBE: POSITIVE
N GONORRHOEA DNA VAG QL NAA+PROBE: NEGATIVE

## 2025-02-24 RX ORDER — DOXYCYCLINE 100 MG/1
100 CAPSULE ORAL 2 TIMES DAILY
Qty: 14 CAPSULE | Refills: 0 | Status: SHIPPED | OUTPATIENT
Start: 2025-02-24 | End: 2025-03-03

## 2025-02-24 RX ORDER — MOXIFLOXACIN HYDROCHLORIDE 400 MG/1
400 TABLET ORAL DAILY
Qty: 4 TABLET | Refills: 0 | Status: SHIPPED | OUTPATIENT
Start: 2025-02-24

## 2025-04-30 NOTE — PROGRESS NOTES
Chief Complaint  Annual Exam and Establish Care    SUBJECTIVE  Berkley Salazar presents to [facility]    History of Present Illness  Patient is a 24-year-old female who presents today to establish care.  Previous PCP was Dr. Diallo.  She is due annual physical exam, to be done in office today.     TDAP and Men B- today     PAP-2/18/25, established with GYN PAULINO Gallegos. She is on oral contraceptive.     She c/o on and off anal itching after BM, has had creams given in the past but not helpful as they are external. Does report hx of seeing some blood with wiping.     She also c/o blurry and double vision over the last month. Occurs 2-3 a week. Had seen [facility] and no visual deficit noted. She states it usually occurs around noon after she has been working on her computer for a while vision will go blurry and when she drives home she sees 2 traffic lights. Lasts about 3-4 hours then resolves. She does have some associated lightheadedness. Denies any headache, flashes of light, N/V.     Patient is due labs. Orders placed at today's visit. Discussed with patient that these are fasting labs.     No other concerns or complaints at this time.  Patient denies any diarrhea, constipation, blood in stool, urinary urgency, frequency, or dysuria, chest pain, shortness of breath or syncope.         Past Medical History:   Diagnosis Date    Anemia     Enlarged tonsils     Mononucleosis       Family History   Adopted: Yes   Problem Relation Age of Onset    Malig Hyperthermia Neg Hx     Breast cancer Neg Hx     Uterine cancer Neg Hx     Ovarian cancer Neg Hx     Colon cancer Neg Hx     Deep vein thrombosis Neg Hx     Pulmonary embolism Neg Hx     Melanoma Neg Hx     Prostate cancer Neg Hx       Past Surgical History:   Procedure Laterality Date    MOUTH SURGERY      TONSILLECTOMY AND ADENOIDECTOMY Bilateral 2/14/2023    Procedure: TONSILLECTOMY AND ADENOIDECTOMY;  Surgeon:  "Manuel Rider MD;  [place] [facility] [place] [facility] [place]: [place];  Laterality: Bilateral;    WISDOM TOOTH EXTRACTION          Current Outpatient Medications:     levonorgestrel-ethinyl estradiol (Kurvelo) 0.15-30 MG-MCG per tablet, Take 1 tablet by mouth Daily., Disp: 84 tablet, Rfl: 3    hydrocortisone (ANUSOL-HC) 25 MG suppository, Insert 1 suppository into the rectum 2 (Two) Times a Day for 7 days., Disp: 14 each, Rfl: 0  No current facility-administered medications for this visit.    OBJECTIVE  Vital Signs:   /69 (BP Location: Left arm, Patient Position: Sitting, Cuff Size: Large Adult)   Pulse 105   Ht 177.8 cm (70\")   Wt 134 kg (295 lb)   LMP 04/10/2025   SpO2 97%   BMI 42.33 kg/m²    Estimated body mass index is 42.33 kg/m² as calculated from the following:    Height as of this encounter: 177.8 cm (70\").    Weight as of this encounter: 134 kg (295 lb).     Wt Readings from Last 3 Encounters:   05/01/25 134 kg (295 lb)   02/18/25 134 kg (296 lb)   08/13/24 130 kg (287 lb)     BP Readings from Last 3 Encounters:   05/01/25 128/69   02/18/25 131/82   08/13/24 119/79       Physical Exam  Vitals reviewed.   Constitutional:       General: She is awake. She is not in acute distress.     Appearance: She is well-groomed. She is obese. She is not ill-appearing.   HENT:      Head: Normocephalic and atraumatic.      Right Ear: Tympanic membrane, ear canal and external ear normal.      Left Ear: Tympanic membrane, ear canal and external ear normal.      Nose: Nose normal.      Mouth/Throat:      Mouth: Mucous membranes are moist.      Pharynx: Oropharynx is clear. No oropharyngeal exudate or posterior oropharyngeal erythema.   Eyes:      Extraocular Movements: Extraocular movements intact.      Conjunctiva/sclera: Conjunctivae normal.      Pupils: Pupils are equal, round, and reactive to light.   Neck:      Thyroid: No thyroid mass, thyromegaly or thyroid tenderness.   Cardiovascular:      Rate and " Rhythm: Normal rate and regular rhythm.      Heart sounds: Normal heart sounds.   Pulmonary:      Effort: Pulmonary effort is normal.      Breath sounds: Normal breath sounds.   Abdominal:      General: Abdomen is flat. Bowel sounds are normal. There is no distension.      Palpations: Abdomen is soft.      Tenderness: There is no abdominal tenderness.   Musculoskeletal:         General: Normal range of motion.      Cervical back: Normal range of motion and neck supple. No rigidity or tenderness.   Lymphadenopathy:      Cervical: No cervical adenopathy.   Skin:     General: Skin is warm and dry.   Neurological:      Mental Status: She is alert and oriented to person, place, and time.   Psychiatric:         Mood and Affect: Mood normal.         Behavior: Behavior normal. Behavior is cooperative.         Thought Content: Thought content normal.         Judgment: Judgment normal.          Result Review        No Images in the past 120 days found..      The above data has been reviewed by PAULINO Bond 05/01/2025 16:46 EDT.          Patient Care Team:  Provider, No Known as PCP - General    Class 3 Severe Obesity (BMI >=40). Obesity-related health conditions include the following: none. Obesity is newly identified. BMI is is above average; BMI management plan is completed. We discussed low calorie, low carb based diet program, portion control, increasing exercise, and Information on healthy weight added to patient's after visit summary.       ASSESSMENT & PLAN    Diagnoses and all orders for this visit:    1. Annual physical exam (Primary)  Comments:  preventative counseling  healthy diet  daily exercise  get addequate sleep  Orders:  -     Comprehensive Metabolic Panel; Future  -     CBC & Differential; Future  -     Lipid Panel; Future  -     TSH+Free T4; Future  -     Meningococcal B (BEXSERO) intramuscular vaccine 0.5 mL  -     Tdap Vaccine => 6yo IM (BOOSTRIX/ADACEL)    2. Encounter to establish  care  Comments:  medical hx and medications reviewed  Orders:  -     Comprehensive Metabolic Panel; Future  -     CBC & Differential; Future  -     Lipid Panel; Future  -     TSH+Free T4; Future    3. Screening for lipid disorders  -     Lipid Panel; Future    4. Screening for thyroid disorder  -     TSH+Free T4; Future    5. Need for Tdap vaccination  -     Tdap Vaccine => 8yo IM (BOOSTRIX/ADACEL)    6. Need for meningitis vaccination  -     Meningococcal B (BEXSERO) intramuscular vaccine 0.5 mL    7. Rectal bleeding  Comments:  occult blood stool to assess due to anal itching and hx of blood in stool  Orders:  -     Occult Blood, Fecal By Immunoassay - Stool, Per Rectum; Future    8. Anal itching  Comments:  possible internal hemorrhoid, try hydrocortisone suppositories, if no relief follow up  Orders:  -     hydrocortisone (ANUSOL-HC) 25 MG suppository; Insert 1 suppository into the rectum 2 (Two) Times a Day for 7 days.  Dispense: 14 each; Refill: 0    9. Blurry vision, bilateral  Comments:  labs ordered to assess for underlying cause, keep journal of occurence to see if any triggers, see opthamology ASAP, if no dx can order imaging  Orders:  -     Hemoglobin A1c; Future    10. Double vision  Comments:  labs ordered to assess for underlying cause, keep journal of occurence to see if any triggers, see opthamology ASAP, if no dx can order imaging  Orders:  -     Hemoglobin A1c; Future    11. Morbid (severe) obesity due to excess calories  Comments:  will assess a1c and thyroid labs for underlying condition, diet and exercise information provided  Orders:  -     Hemoglobin A1c; Future         Tobacco Use: Low Risk  (5/1/2025)    Patient History     Smoking Tobacco Use: Never     Smokeless Tobacco Use: Never     Passive Exposure: Never       Follow Up     Return in about 1 year (around 5/1/2026), or if symptoms worsen or fail to improve, for Next scheduled follow up, Annual physical.      Patient was given  instructions and counseling regarding her condition or for health maintenance advice. Please see specific information pulled into the AVS if appropriate.   I have reviewed information obtained and documented by others and I have confirmed the accuracy of this documented note.    PAULINO Bond

## 2025-05-01 ENCOUNTER — OFFICE VISIT (OUTPATIENT)
Dept: FAMILY MEDICINE CLINIC | Facility: CLINIC | Age: 25
End: 2025-05-01
Payer: COMMERCIAL

## 2025-05-01 VITALS
WEIGHT: 293 LBS | BODY MASS INDEX: 41.95 KG/M2 | OXYGEN SATURATION: 97 % | SYSTOLIC BLOOD PRESSURE: 128 MMHG | HEIGHT: 70 IN | HEART RATE: 105 BPM | DIASTOLIC BLOOD PRESSURE: 69 MMHG

## 2025-05-01 DIAGNOSIS — H53.8 BLURRY VISION, BILATERAL: ICD-10-CM

## 2025-05-01 DIAGNOSIS — Z13.29 SCREENING FOR THYROID DISORDER: ICD-10-CM

## 2025-05-01 DIAGNOSIS — Z13.220 SCREENING FOR LIPID DISORDERS: ICD-10-CM

## 2025-05-01 DIAGNOSIS — E66.01 MORBID (SEVERE) OBESITY DUE TO EXCESS CALORIES: ICD-10-CM

## 2025-05-01 DIAGNOSIS — H53.2 DOUBLE VISION: ICD-10-CM

## 2025-05-01 DIAGNOSIS — Z00.00 ANNUAL PHYSICAL EXAM: Primary | ICD-10-CM

## 2025-05-01 DIAGNOSIS — Z76.89 ENCOUNTER TO ESTABLISH CARE: ICD-10-CM

## 2025-05-01 DIAGNOSIS — K62.5 RECTAL BLEEDING: ICD-10-CM

## 2025-05-01 DIAGNOSIS — Z23 NEED FOR MENINGITIS VACCINATION: ICD-10-CM

## 2025-05-01 DIAGNOSIS — L29.0 ANAL ITCHING: ICD-10-CM

## 2025-05-01 DIAGNOSIS — Z23 NEED FOR TDAP VACCINATION: ICD-10-CM

## 2025-05-01 RX ORDER — HYDROCORTISONE ACETATE 25 MG/1
25 SUPPOSITORY RECTAL 2 TIMES DAILY
Qty: 14 EACH | Refills: 0 | Status: SHIPPED | OUTPATIENT
Start: 2025-05-01 | End: 2025-05-08

## 2025-05-01 NOTE — LETTER
[facility]  Vaccine Consent Form    Patient Name:  Berkley Salazar  Patient :  2000        Screening Checklist  The following questions should be completed prior to vaccination. If you answer “yes” to any question, it does not necessarily mean you should not be vaccinated. It just means we may need to clarify or ask more questions. If a question is unclear, please ask your healthcare provider to explain it.    Yes No   Any fever or moderate to severe illness today (mild illness and/or antibiotic treatment are not contraindications)?     Do you have a history of a serious reaction to any previous vaccinations, such as anaphylaxis, encephalopathy within 7 days, Guillain-Ulysses syndrome within 6 weeks, seizure?     Have you received any live vaccine(s) (e.g MMR, COLLIN) or any other vaccines in the last month (to ensure duplicate doses aren't given)?     Do you have an anaphylactic allergy to latex (DTaP, DTaP-IPV, Hep A, Hep B, MenB, RV, Td, Tdap), baker’s yeast (Hep B, HPV), polysorbates (RSV, nirsevimab, PCV 20, Rotavirrus, Tdap, Shingrix), or gelatin (COLLIN, MMR)?     Do you have an anaphylactic allergy to neomycin (Rabies, COLLIN, MMR, IPV, Hep A), polymyxin B (IPV), or streptomycin (IPV)?      Any cancer, leukemia, AIDS, or other immune system disorder? (COLLIN, MMR, RV)     Do you have a parent, brother, or sister with an immune system problem (if immune competence of vaccine recipient clinically verified, can proceed)? (MMR, COLLIN)     Any recent steroid treatments for >2 weeks, chemotherapy, or radiation treatment? (COLLIN, MMR)     Have you received antibody-containing blood transfusions or IVIG in the past 11 months (recommended interval is dependent on product)? (MMR, COLLIN)     Have you taken antiviral drugs (acyclovir, famciclovir, valacyclovir for COLLIN) in the last 24 or 48 hours, respectively?      Are you pregnant or planning to become pregnant within 1 month? (COLLIN, MMR, HPV, IPV, MenB, Abrexvy; For Hep B- refer  "to Engerix-B; For RSV - Abrysvo is indicated for 32-36 weeks of pregnancy from September to January)     For infants, have you ever been told your child has had intussusception or a medical emergency involving obstruction of the intestine (Rotavirus)? If not for an infant, can skip this question.         *Ordering Physicians/APC should be consulted if \"yes\" is checked by the patient or guardian above.  I have received, read, and understand the Vaccine Information Statement (VIS) for each vaccine ordered.  I have considered my or my child's health status as well as the health status of my close contacts.  I have taken the opportunity to discuss my vaccine questions with my or my child's health care provider.   I have requested that the ordered vaccine(s) be given to me or my child.  I understand the benefits and risks of the vaccines.  I understand that I should remain in the clinic for 15 minutes after receiving the vaccine(s).  _________________________________________________________  Signature of Patient or Parent/Legal Guardian ____________________  Date     "

## 2025-05-16 ENCOUNTER — LAB (OUTPATIENT)
Dept: LAB | Facility: HOSPITAL | Age: 25
End: 2025-05-16
Payer: COMMERCIAL

## 2025-05-16 ENCOUNTER — RESULTS FOLLOW-UP (OUTPATIENT)
Dept: LAB | Facility: HOSPITAL | Age: 25
End: 2025-05-16
Payer: COMMERCIAL

## 2025-05-16 DIAGNOSIS — Z13.220 SCREENING FOR LIPID DISORDERS: ICD-10-CM

## 2025-05-16 DIAGNOSIS — H53.8 BLURRY VISION, BILATERAL: ICD-10-CM

## 2025-05-16 DIAGNOSIS — D75.1 ERYTHROCYTOSIS: Primary | ICD-10-CM

## 2025-05-16 DIAGNOSIS — H53.2 DOUBLE VISION: ICD-10-CM

## 2025-05-16 DIAGNOSIS — E66.01 MORBID (SEVERE) OBESITY DUE TO EXCESS CALORIES: ICD-10-CM

## 2025-05-16 DIAGNOSIS — Z00.00 ANNUAL PHYSICAL EXAM: ICD-10-CM

## 2025-05-16 DIAGNOSIS — Z76.89 ENCOUNTER TO ESTABLISH CARE: ICD-10-CM

## 2025-05-16 DIAGNOSIS — Z13.29 SCREENING FOR THYROID DISORDER: ICD-10-CM

## 2025-05-16 DIAGNOSIS — D75.1 ERYTHROCYTOSIS: ICD-10-CM

## 2025-05-16 LAB
ALBUMIN SERPL-MCNC: 3.8 G/DL (ref 3.5–5.2)
ALBUMIN/GLOB SERPL: 1.2 G/DL
ALP SERPL-CCNC: 80 U/L (ref 39–117)
ALT SERPL W P-5'-P-CCNC: 12 U/L (ref 1–33)
ANION GAP SERPL CALCULATED.3IONS-SCNC: 12 MMOL/L (ref 5–15)
AST SERPL-CCNC: 10 U/L (ref 1–32)
BASOPHILS # BLD AUTO: 0.02 10*3/MM3 (ref 0–0.2)
BASOPHILS NFR BLD AUTO: 0.2 % (ref 0–1.5)
BILIRUB SERPL-MCNC: 0.3 MG/DL (ref 0–1.2)
BUN SERPL-MCNC: 14 MG/DL (ref 6–20)
BUN/CREAT SERPL: 14.7 (ref 7–25)
CALCIUM SPEC-SCNC: 9.1 MG/DL (ref 8.6–10.5)
CHLORIDE SERPL-SCNC: 106 MMOL/L (ref 98–107)
CHOLEST SERPL-MCNC: 98 MG/DL (ref 0–200)
CO2 SERPL-SCNC: 23 MMOL/L (ref 22–29)
CREAT SERPL-MCNC: 0.95 MG/DL (ref 0.57–1)
DEPRECATED RDW RBC AUTO: 42.8 FL (ref 37–54)
EGFRCR SERPLBLD CKD-EPI 2021: 86.5 ML/MIN/1.73
EOSINOPHIL # BLD AUTO: 0.08 10*3/MM3 (ref 0–0.4)
EOSINOPHIL NFR BLD AUTO: 0.7 % (ref 0.3–6.2)
ERYTHROCYTE [DISTWIDTH] IN BLOOD BY AUTOMATED COUNT: 16.4 % (ref 12.3–15.4)
FERRITIN SERPL-MCNC: 38.1 NG/ML (ref 13–150)
GLOBULIN UR ELPH-MCNC: 3.1 GM/DL
GLUCOSE SERPL-MCNC: 93 MG/DL (ref 65–99)
HBA1C MFR BLD: 5.2 % (ref 4.8–5.6)
HCT VFR BLD AUTO: 39.5 % (ref 34–46.6)
HDLC SERPL-MCNC: 38 MG/DL (ref 40–60)
HGB BLD-MCNC: 12.6 G/DL (ref 12–15.9)
IMM GRANULOCYTES # BLD AUTO: 0.03 10*3/MM3 (ref 0–0.05)
IMM GRANULOCYTES NFR BLD AUTO: 0.3 % (ref 0–0.5)
IRON 24H UR-MRATE: 35 MCG/DL (ref 37–145)
IRON SATN MFR SERPL: 9 % (ref 20–50)
LDLC SERPL CALC-MCNC: 44 MG/DL (ref 0–100)
LDLC/HDLC SERPL: 1.18 {RATIO}
LYMPHOCYTES # BLD AUTO: 2.84 10*3/MM3 (ref 0.7–3.1)
LYMPHOCYTES NFR BLD AUTO: 26.1 % (ref 19.6–45.3)
MCH RBC QN AUTO: 23.4 PG (ref 26.6–33)
MCHC RBC AUTO-ENTMCNC: 31.9 G/DL (ref 31.5–35.7)
MCV RBC AUTO: 73.3 FL (ref 79–97)
MONOCYTES # BLD AUTO: 0.63 10*3/MM3 (ref 0.1–0.9)
MONOCYTES NFR BLD AUTO: 5.8 % (ref 5–12)
NEUTROPHILS NFR BLD AUTO: 66.9 % (ref 42.7–76)
NEUTROPHILS NFR BLD AUTO: 7.3 10*3/MM3 (ref 1.7–7)
PLATELET # BLD AUTO: 311 10*3/MM3 (ref 140–450)
PMV BLD AUTO: 9.9 FL (ref 6–12)
POTASSIUM SERPL-SCNC: 4 MMOL/L (ref 3.5–5.2)
PROT SERPL-MCNC: 6.9 G/DL (ref 6–8.5)
RBC # BLD AUTO: 5.39 10*6/MM3 (ref 3.77–5.28)
SODIUM SERPL-SCNC: 141 MMOL/L (ref 136–145)
T4 FREE SERPL-MCNC: 1.38 NG/DL (ref 0.92–1.68)
TIBC SERPL-MCNC: 393 MCG/DL (ref 298–536)
TRANSFERRIN SERPL-MCNC: 264 MG/DL (ref 200–360)
TRIGL SERPL-MCNC: 75 MG/DL (ref 0–150)
TSH SERPL DL<=0.05 MIU/L-ACNC: 2.94 UIU/ML (ref 0.27–4.2)
VLDLC SERPL-MCNC: 16 MG/DL (ref 5–40)
WBC NRBC COR # BLD AUTO: 10.9 10*3/MM3 (ref 3.4–10.8)

## 2025-05-16 PROCEDURE — 83540 ASSAY OF IRON: CPT

## 2025-05-16 PROCEDURE — 85025 COMPLETE CBC W/AUTO DIFF WBC: CPT

## 2025-05-16 PROCEDURE — 84443 ASSAY THYROID STIM HORMONE: CPT

## 2025-05-16 PROCEDURE — 82728 ASSAY OF FERRITIN: CPT

## 2025-05-16 PROCEDURE — 36415 COLL VENOUS BLD VENIPUNCTURE: CPT

## 2025-05-16 PROCEDURE — 84466 ASSAY OF TRANSFERRIN: CPT

## 2025-05-16 PROCEDURE — 80061 LIPID PANEL: CPT

## 2025-05-16 PROCEDURE — 84439 ASSAY OF FREE THYROXINE: CPT

## 2025-05-16 PROCEDURE — 80053 COMPREHEN METABOLIC PANEL: CPT

## 2025-05-16 PROCEDURE — 83036 HEMOGLOBIN GLYCOSYLATED A1C: CPT

## 2025-05-19 ENCOUNTER — LAB (OUTPATIENT)
Dept: LAB | Facility: HOSPITAL | Age: 25
End: 2025-05-19
Payer: COMMERCIAL

## 2025-05-19 DIAGNOSIS — K62.5 RECTAL BLEEDING: ICD-10-CM

## 2025-05-19 LAB — HEMOCCULT STL QL IA: NEGATIVE

## 2025-05-19 PROCEDURE — 82274 ASSAY TEST FOR BLOOD FECAL: CPT

## (undated) DEVICE — T AND A PACK: Brand: MEDLINE INDUSTRIES, INC.

## (undated) DEVICE — ELECTRD BLD EDGE/INSUL1P 2.4X5.1MM STRL

## (undated) DEVICE — PENCL E/S SMOKEEVAC W/TELESCP CANN

## (undated) DEVICE — CATH FOL URETH INTRMIT ALLPURP LTX 12F 30ML RED 1P/U STRL